# Patient Record
Sex: FEMALE | Race: WHITE | NOT HISPANIC OR LATINO | Employment: FULL TIME | RURAL
[De-identification: names, ages, dates, MRNs, and addresses within clinical notes are randomized per-mention and may not be internally consistent; named-entity substitution may affect disease eponyms.]

---

## 2022-12-19 ENCOUNTER — OFFICE VISIT (OUTPATIENT)
Dept: FAMILY MEDICINE CLINIC | Facility: CLINIC | Age: 32
End: 2022-12-19

## 2022-12-19 VITALS
RESPIRATION RATE: 14 BRPM | SYSTOLIC BLOOD PRESSURE: 118 MMHG | HEIGHT: 66 IN | WEIGHT: 252.38 LBS | TEMPERATURE: 97 F | OXYGEN SATURATION: 98 % | DIASTOLIC BLOOD PRESSURE: 80 MMHG | HEART RATE: 80 BPM | BODY MASS INDEX: 40.56 KG/M2

## 2022-12-19 DIAGNOSIS — E78.2 MIXED HYPERLIPIDEMIA: ICD-10-CM

## 2022-12-19 DIAGNOSIS — G43.009 MIGRAINE WITHOUT AURA AND WITHOUT STATUS MIGRAINOSUS, NOT INTRACTABLE: ICD-10-CM

## 2022-12-19 DIAGNOSIS — M54.50 CHRONIC BILATERAL LOW BACK PAIN WITHOUT SCIATICA: ICD-10-CM

## 2022-12-19 DIAGNOSIS — J30.1 SEASONAL ALLERGIC RHINITIS DUE TO POLLEN: ICD-10-CM

## 2022-12-19 DIAGNOSIS — E66.01 MORBID OBESITY: ICD-10-CM

## 2022-12-19 DIAGNOSIS — G89.29 CHRONIC BILATERAL LOW BACK PAIN WITHOUT SCIATICA: ICD-10-CM

## 2022-12-19 DIAGNOSIS — F41.1 GENERALIZED ANXIETY DISORDER: ICD-10-CM

## 2022-12-19 DIAGNOSIS — Z00.00 ROUTINE GENERAL MEDICAL EXAMINATION AT A HEALTH CARE FACILITY: Primary | ICD-10-CM

## 2022-12-19 DIAGNOSIS — K21.9 GASTROESOPHAGEAL REFLUX DISEASE WITHOUT ESOPHAGITIS: ICD-10-CM

## 2022-12-19 PROCEDURE — 3008F BODY MASS INDEX DOCD: CPT | Performed by: INTERNAL MEDICINE

## 2022-12-19 PROCEDURE — 99395 PREV VISIT EST AGE 18-39: CPT | Performed by: INTERNAL MEDICINE

## 2022-12-19 PROCEDURE — 36415 COLL VENOUS BLD VENIPUNCTURE: CPT | Performed by: INTERNAL MEDICINE

## 2022-12-19 PROCEDURE — 2014F MENTAL STATUS ASSESS: CPT | Performed by: INTERNAL MEDICINE

## 2022-12-19 RX ORDER — FLUTICASONE PROPIONATE 50 MCG
2 SPRAY, SUSPENSION (ML) NASAL DAILY
Qty: 15.8 ML | Refills: 3 | Status: SHIPPED | OUTPATIENT
Start: 2022-12-19

## 2022-12-19 RX ORDER — CETIRIZINE HYDROCHLORIDE 10 MG/1
10 TABLET ORAL DAILY
Qty: 30 TABLET | Refills: 3 | Status: SHIPPED | OUTPATIENT
Start: 2022-12-19

## 2022-12-19 RX ORDER — NORGESTIMATE AND ETHINYL ESTRADIOL 0.25-0.035
1 KIT ORAL DAILY
COMMUNITY
Start: 2022-09-20 | End: 2023-09-20

## 2022-12-19 RX ORDER — SUMATRIPTAN 50 MG/1
TABLET, FILM COATED ORAL
Qty: 9 TABLET | Refills: 1 | Status: SHIPPED | OUTPATIENT
Start: 2022-12-19

## 2022-12-19 NOTE — PROGRESS NOTES
Venipuncture Blood Specimen Collection  Venipuncture performed in left arm by Ernestina Grande MA with good hemostasis. Patient tolerated the procedure well without complications.   12/19/22   Ernestina Grande MA

## 2022-12-19 NOTE — ASSESSMENT & PLAN NOTE
Evaluated initially 1/4/2019, pattern of discomfort in the mid upper thoracic region, paraspinal muscular tenderness, with no neurologic manifestations concern.  Discussion in the past of consideration of physical therapy which she declined.  Doing overall better at this time.  Advised concerns.

## 2022-12-19 NOTE — PATIENT INSTRUCTIONS
Health Maintenance, Female  Adopting a healthy lifestyle and getting preventive care can go a long way to promote health and wellness. Talk with your health care provider about what schedule of regular examinations is right for you. This is a good chance for you to check in with your provider about disease prevention and staying healthy.  In between checkups, there are plenty of things you can do on your own. Experts have done a lot of research about which lifestyle changes and preventive measures are most likely to keep you healthy. Ask your health care provider for more information.  Weight and diet  Eat a healthy diet  Be sure to include plenty of vegetables, fruits, low-fat dairy products, and lean protein.  Do not eat a lot of foods high in solid fats, added sugars, or salt.  Get regular exercise. This is one of the most important things you can do for your health.  Most adults should exercise for at least 150 minutes each week. The exercise should increase your heart rate and make you sweat (moderate-intensity exercise).  Most adults should also do strengthening exercises at least twice a week. This is in addition to the moderate-intensity exercise.     Maintain a healthy weight  Body mass index (BMI) is a measurement that can be used to identify possible weight problems. It estimates body fat based on height and weight. Your health care provider can help determine your BMI and help you achieve or maintain a healthy weight.  For females 20 years of age and older:  A BMI below 18.5 is considered underweight.  A BMI of 18.5 to 24.9 is normal.  A BMI of 25 to 29.9 is considered overweight.  A BMI of 30 and above is considered obese.     Watch levels of cholesterol and blood lipids  You should start having your blood tested for lipids and cholesterol at 20 years of age, then have this test every 5 years.  You may need to have your cholesterol levels checked more often if:  Your lipid or cholesterol levels are  high.  You are older than 50 years of age.  You are at high risk for heart disease.     Cancer screening  Lung Cancer  Lung cancer screening is recommended for adults 55-80 years old who are at high risk for lung cancer because of a history of smoking.  A yearly low-dose CT scan of the lungs is recommended for people who:  Currently smoke.  Have quit within the past 15 years.  Have at least a 30-pack-year history of smoking. A pack year is smoking an average of one pack of cigarettes a day for 1 year.  Yearly screening should continue until it has been 15 years since you quit.  Yearly screening should stop if you develop a health problem that would prevent you from having lung cancer treatment.     Breast Cancer  Practice breast self-awareness. This means understanding how your breasts normally appear and feel.  It also means doing regular breast self-exams. Let your health care provider know about any changes, no matter how small.  If you are in your 20s or 30s, you should have a clinical breast exam (CBE) by a health care provider every 1-3 years as part of a regular health exam.  If you are 40 or older, have a CBE every year. Also consider having a breast X-ray (mammogram) every year.  If you have a family history of breast cancer, talk to your health care provider about genetic screening.  If you are at high risk for breast cancer, talk to your health care provider about having an MRI and a mammogram every year.  Breast cancer gene (BRCA) assessment is recommended for women who have family members with BRCA-related cancers. BRCA-related cancers include:  Breast.  Ovarian.  Tubal.  Peritoneal cancers.  Results of the assessment will determine the need for genetic counseling and BRCA1 and BRCA2 testing.     Cervical Cancer  Your health care provider may recommend that you be screened regularly for cancer of the pelvic organs (ovaries, uterus, and vagina). This screening involves a pelvic examination, including  checking for microscopic changes to the surface of your cervix (Pap test). You may be encouraged to have this screening done every 3 years, beginning at age 21.  For women ages 30-65, health care providers may recommend pelvic exams and Pap testing every 3 years, or they may recommend the Pap and pelvic exam, combined with testing for human papilloma virus (HPV), every 5 years. Some types of HPV increase your risk of cervical cancer. Testing for HPV may also be done on women of any age with unclear Pap test results.  Other health care providers may not recommend any screening for nonpregnant women who are considered low risk for pelvic cancer and who do not have symptoms. Ask your health care provider if a screening pelvic exam is right for you.  If you have had past treatment for cervical cancer or a condition that could lead to cancer, you need Pap tests and screening for cancer for at least 20 years after your treatment. If Pap tests have been discontinued, your risk factors (such as having a new sexual partner) need to be reassessed to determine if screening should resume. Some women have medical problems that increase the chance of getting cervical cancer. In these cases, your health care provider may recommend more frequent screening and Pap tests.     Colorectal Cancer  This type of cancer can be detected and often prevented.  Routine colorectal cancer screening usually begins at 50 years of age and continues through 75 years of age.  Your health care provider may recommend screening at an earlier age if you have risk factors for colon cancer.  Your health care provider may also recommend using home test kits to check for hidden blood in the stool.  A small camera at the end of a tube can be used to examine your colon directly (sigmoidoscopy or colonoscopy). This is done to check for the earliest forms of colorectal cancer.  Routine screening usually begins at age 50.  Direct examination of the colon should  be repeated every 5-10 years through 75 years of age. However, you may need to be screened more often if early forms of precancerous polyps or small growths are found.     Skin Cancer  Check your skin from head to toe regularly.  Tell your health care provider about any new moles or changes in moles, especially if there is a change in a mole's shape or color.  Also tell your health care provider if you have a mole that is larger than the size of a pencil eraser.  Always use sunscreen. Apply sunscreen liberally and repeatedly throughout the day.  Protect yourself by wearing long sleeves, pants, a wide-brimmed hat, and sunglasses whenever you are outside.     Heart disease, diabetes, and high blood pressure  High blood pressure causes heart disease and increases the risk of stroke. High blood pressure is more likely to develop in:  People who have blood pressure in the high end of the normal range (130-139/85-89 mm Hg).  People who are overweight or obese.  People who are .  If you are 18-39 years of age, have your blood pressure checked every 3-5 years. If you are 40 years of age or older, have your blood pressure checked every year. You should have your blood pressure measured twice--once when you are at a hospital or clinic, and once when you are not at a hospital or clinic. Record the average of the two measurements. To check your blood pressure when you are not at a hospital or clinic, you can use:  An automated blood pressure machine at a pharmacy.  A home blood pressure monitor.  If you are between 55 years and 79 years old, ask your health care provider if you should take aspirin to prevent strokes.  Have regular diabetes screenings. This involves taking a blood sample to check your fasting blood sugar level.  If you are at a normal weight and have a low risk for diabetes, have this test once every three years after 45 years of age.  If you are overweight and have a high risk for diabetes,  consider being tested at a younger age or more often.  Preventing infection  Hepatitis B  If you have a higher risk for hepatitis B, you should be screened for this virus. You are considered at high risk for hepatitis B if:  You were born in a country where hepatitis B is common. Ask your health care provider which countries are considered high risk.  Your parents were born in a high-risk country, and you have not been immunized against hepatitis B (hepatitis B vaccine).  You have HIV or AIDS.  You use needles to inject street drugs.  You live with someone who has hepatitis B.  You have had sex with someone who has hepatitis B.  You get hemodialysis treatment.  You take certain medicines for conditions, including cancer, organ transplantation, and autoimmune conditions.     Hepatitis C  Blood testing is recommended for:  Everyone born from 1945 through 1965.  Anyone with known risk factors for hepatitis C.     Sexually transmitted infections (STIs)  You should be screened for sexually transmitted infections (STIs) including gonorrhea and chlamydia if:  You are sexually active and are younger than 24 years of age.  You are older than 24 years of age and your health care provider tells you that you are at risk for this type of infection.  Your sexual activity has changed since you were last screened and you are at an increased risk for chlamydia or gonorrhea. Ask your health care provider if you are at risk.  If you do not have HIV, but are at risk, it may be recommended that you take a prescription medicine daily to prevent HIV infection. This is called pre-exposure prophylaxis (PrEP). You are considered at risk if:  You are sexually active and do not regularly use condoms or know the HIV status of your partner(s).  You take drugs by injection.  You are sexually active with a partner who has HIV.     Talk with your health care provider about whether you are at high risk of being infected with HIV. If you choose to  begin PrEP, you should first be tested for HIV. You should then be tested every 3 months for as long as you are taking PrEP.  Pregnancy  If you are premenopausal and you may become pregnant, ask your health care provider about preconception counseling.  If you may become pregnant, take 400 to 800 micrograms (mcg) of folic acid every day.  If you want to prevent pregnancy, talk to your health care provider about birth control (contraception).  Osteoporosis and menopause  Osteoporosis is a disease in which the bones lose minerals and strength with aging. This can result in serious bone fractures. Your risk for osteoporosis can be identified using a bone density scan.  If you are 65 years of age or older, or if you are at risk for osteoporosis and fractures, ask your health care provider if you should be screened.  Ask your health care provider whether you should take a calcium or vitamin D supplement to lower your risk for osteoporosis.  Menopause may have certain physical symptoms and risks.  Hormone replacement therapy may reduce some of these symptoms and risks.  Talk to your health care provider about whether hormone replacement therapy is right for you.  Follow these instructions at home:  Schedule regular health, dental, and eye exams.  Stay current with your immunizations.  Do not use any tobacco products including cigarettes, chewing tobacco, or electronic cigarettes.  If you are pregnant, do not drink alcohol.  If you are breastfeeding, limit how much and how often you drink alcohol.  Limit alcohol intake to no more than 1 drink per day for nonpregnant women. One drink equals 12 ounces of beer, 5 ounces of wine, or 1½ ounces of hard liquor.  Do not use street drugs.  Do not share needles.  Ask your health care provider for help if you need support or information about quitting drugs.  Tell your health care provider if you often feel depressed.  Tell your health care provider if you have ever been abused or do  not feel safe at home.  This information is not intended to replace advice given to you by your health care provider. Make sure you discuss any questions you have with your health care provider.  Document Released: 07/02/2012 Document Revised: 05/25/2017 Document Reviewed: 09/20/2016  [employer] Interactive Patient Education © 2018 [employer]

## 2022-12-19 NOTE — PROGRESS NOTES
Female Physical Note      Date: 2022   Patient Name: Keshia Alfredo  : 1990   MRN: [id number]     Chief Complaint   Patient presents with   • Annual Exam       History of Present Illness: Keshia Alfredo is a 32 y.o. female who is here today for their annual health maintenance and physical.  Also discussion with medical problems.  Previous noted lower back pain pattern has generally done better with periodic muscular type flares, responsive to anti-inflammatories, no associated weakness, no bowel or bladder incontinence, no shooting pain down legs.  Allergy symptoms do well, typical spring and fall triggers for which she is doing a little better now, requesting refills.  Anxiety symptoms continue good control on psychiatry locally and as needed therapy, on Zoloft since 2019 per psychiatry in [facility].  No SI/HI.  Regarding hyperlipidemia pattern and obesity from previous evaluations, she is making efforts to eat healthier and be more active, having decreased soda intake.  She has had 9 pound weight loss since early .  Otherwise previous reflux type symptoms have done well with infrequent but as needed use of over-the-counter antacid, she makes effort to eat healthier foods which does benefit as well.    Subjective      Review of Systems    Past Medical History, Social History, Family History and Care Team were all reviewed with patient and updated as appropriate.       Current Outpatient Medications:   •  norgestimate-ethinyl estradiol (ORTHO-CYCLEN) 0.25-35 MG-MCG per tablet, Take 1 tablet by mouth Daily., Disp: , Rfl:   •  sertraline (ZOLOFT) 50 MG tablet, Take 50 mg by mouth Every Morning., Disp: , Rfl:   •  cetirizine (zyrTEC) 10 MG tablet, Take 1 tablet by mouth Daily., Disp: 30 tablet, Rfl: 3  •  fluticasone (Flonase) 50 MCG/ACT nasal spray, 2 sprays into the nostril(s) as directed by provider Daily., Disp: 15.8 mL, Rfl: 3  •  SUMAtriptan (Imitrex) 50 MG tablet, Take one tablet at  onset of headache. May repeat dose one time in 2 hours if headache not relieved., Disp: 9 tablet, Rfl: 1    No Known Allergies    Health Maintenance Summary          Overdue - INFLUENZA VACCINE (Yearly - August to March) Overdue since 8/1/2022    10/12/2020  Imm Admin: Influenza, Unspecified    01/06/2019  Imm Admin: Influenza, Unspecified          Overdue - COVID-19 Vaccine (4 - Booster) Overdue since 9/20/2022 07/26/2022  Imm Admin: COVID-19 (MODERNA) 1st, 2nd, 3rd Dose Only    05/10/2021  Imm Admin: COVID-19 (MODERNA) 1st, 2nd, 3rd Dose Only    04/12/2021  Imm Admin: COVID-19 (UNSPECIFIED)    04/12/2021  Imm Admin: COVID-19 (MODERNA) 1st, 2nd, 3rd Dose Only          Ordered - HEPATITIS C SCREENING (Once) Ordered on 12/19/2022    No completion, postpone, or frequency change history exists for this topic.          Overdue - ANNUAL PHYSICAL (Yearly) Overdue - never done    No completion, postpone, or frequency change history exists for this topic.          Ordered - LIPID PANEL (Yearly) Ordered on 12/19/2022    No completion, postpone, or frequency change history exists for this topic.          PAP SMEAR (Every 3 Years) Next due on 1/1/2024 01/01/2021  Done - Negative PAP smear through Gyn early 2021, 3 year followup          TDAP/TD VACCINES (2 - Td or Tdap) Next due on 12/3/2024    12/03/2014  Imm Admin: Tdap          Pneumococcal Vaccine 0-64 (Series Information) Aged Out    No completion, postpone, or frequency change history exists for this topic.                Immunization History   Administered Date(s) Administered   • COVID-19 (MODERNA) 1st, 2nd, 3rd Dose Only 04/12/2021, 05/10/2021, 07/26/2022   • COVID-19 (UNSPECIFIED) 04/12/2021   • Hepatitis A 01/06/2019, 07/06/2019   • Influenza, Unspecified 01/06/2019, 10/12/2020   • Tdap 12/03/2014       Colorectal Screening:   Last Completed Colonoscopy     This patient has no relevant Health Maintenance data.        Pap:   Last Completed Pap Smear           PAP SMEAR (Every 3 Years) Next due on 1/1/2024 01/01/2021  Done - Negative PAP smear through Gyn early 2021, 3 year followup               Mammogram:   Last Completed Mammogram     This patient has no relevant Health Maintenance data.           CT for Smoker (Age 50-80, 20 pk yr): Not applicable  Hep C (Age 18-79 once): Pending  HIV (Age 15-65 once): Pending  A1c: No results found for: HGBA1C   Lipid panel: No results found for: LIPIDEXCLUSI    The ASCVD Risk score (Uziel DK, et al., 2019) failed to calculate for the following reasons:    The 2019 ASCVD risk score is only valid for ages 40 to 79      Tobacco Use: Unknown   • Smoking Tobacco Use: Never   • Smokeless Tobacco Use: Unknown   • Passive Exposure: Not on file       Social History     Substance and Sexual Activity   Alcohol Use Not Currently        Social History     Substance and Sexual Activity   Drug Use Not Currently        Diet/Physical activity: Ongoing attempts to improve dietary intake, with some healthier food types, specifically decreasing calorie containing beverages.  Increase activity level and still needs to schedule exercise    Social History     Substance and Sexual Activity   Sexual Activity Defer     No LMP recorded.    Depression: PHQ-2 Depression Screening  PHQ-9 Total Score: 0     Intimate partner violence: (Screen on initial visit, pregnant women, women with injuries, older adult with injury or evidence of neglect):  • Violence can be a problem in many people's lives, so I now ask every patient about trauma or abuse they may have experienced in a relationship.  • Stress/Safety - Do you feel safe in your relationship?  • Afraid/Abused - Have you ever been in a relationship where you were threatened, hurt, or afraid?  • Friend/Family - Are your friends aware you have been hurt?  • Emergency Plan - Do you have a safe place to go and the resources you need in an emergency?    Osteoporosis:   • Ost menopausal women < 65 with RF  "(advancing age, previous fracture, glucocorticoid therapy, parental hip fracture, low body weight, current cigarette smoking, excessive alcohol consumption, rheumatoid arthritis, secondary osteoporosis [hypogonadism/premature menopause, malabsorption, chronic liver disease, IBD]).  • All women 65 or older    Objective     Physical Exam:  Vitals:    12/19/22 0915 12/19/22 0931   BP: 128/95 118/80   BP Location: Right arm    Patient Position: Sitting    Cuff Size: Adult    Pulse: 80    Resp: 14    Temp: 97 °F (36.1 °C)    TempSrc: Temporal    SpO2: 98%    Weight: 114 kg (252 lb 6 oz)    Height: 167.6 cm (66\")      Body mass index is 40.73 kg/m².     Physical Exam  Constitutional:       General: She is not in acute distress.     Appearance: Normal appearance. She is obese. She is not ill-appearing, toxic-appearing or diaphoretic.   HENT:      Head: Normocephalic and atraumatic.      Right Ear: Ear canal and external ear normal.      Left Ear: Ear canal and external ear normal.      Ears:      Comments: Mild fluid behind the TMs bilaterally, otherwise clear     Nose: Rhinorrhea present.      Comments: Mild clear rhinorrhea     Mouth/Throat:      Mouth: Mucous membranes are moist.      Pharynx: Oropharynx is clear. No oropharyngeal exudate or posterior oropharyngeal erythema.   Eyes:      Extraocular Movements: Extraocular movements intact.      Conjunctiva/sclera: Conjunctivae normal.      Pupils: Pupils are equal, round, and reactive to light.   Cardiovascular:      Rate and Rhythm: Normal rate and regular rhythm.      Pulses: Normal pulses.      Heart sounds: Normal heart sounds. No murmur heard.    No friction rub. No gallop.   Pulmonary:      Effort: Pulmonary effort is normal. No respiratory distress.      Breath sounds: Normal breath sounds. No stridor. No wheezing.   Abdominal:      General: Abdomen is flat. Bowel sounds are normal. There is no distension.      Palpations: Abdomen is soft. There is no mass.      " Tenderness: There is no abdominal tenderness. There is no guarding or rebound.      Hernia: No hernia is present.   Genitourinary:     Comments: Per gynecology  Musculoskeletal:      Cervical back: Normal range of motion and neck supple.      Right lower leg: No edema.      Left lower leg: No edema.      Comments: No C/T/L spinous process tenderness.  No current paraspinal muscular tenderness.   Skin:     General: Skin is warm and dry.      Capillary Refill: Capillary refill takes less than 2 seconds.   Neurological:      General: No focal deficit present.      Mental Status: She is alert and oriented to person, place, and time. Mental status is at baseline.   Psychiatric:         Mood and Affect: Mood normal.         Behavior: Behavior normal.         Thought Content: Thought content normal.         Judgment: Judgment normal.         Procedures    Assessment / Plan      Assessment/Plan:   Diagnoses and all orders for this visit:    1. Routine general medical examination at a health care facility (Primary)  Assessment & Plan:  Last blood work 1/7/2019, order provided today with managed per results.  Tdap vaccine given 12/3/2014.  Multiple COVID vaccines but she is due for new by vaginal vaccine, recommend.  Recommend flu vaccine.  Pap smear obtained early 2021 by gynecologist in [place], she will be due in 3 years by her report    Orders:  -     CBC & Differential; Future  -     Comprehensive Metabolic Panel; Future  -     Urinalysis With Microscopic If Indicated (No Culture) - Urine, Clean Catch; Future  -     Lipid Panel; Future  -     TSH Rfx On Abnormal To Free T4; Future  -     Hepatitis C Antibody; Future  -     HIV-1 / O / 2 Ag / Antibody 4th Generation; Future  -     Hemoglobin A1c; Future  -     Hemoglobin A1c  -     Hepatitis C Antibody  -     TSH Rfx On Abnormal To Free T4  -     Lipid Panel  -     Urinalysis With Microscopic If Indicated (No Culture) - Urine, Clean Catch  -     Comprehensive Metabolic  Panel  -     CBC & Differential  -     HIV-1 / O / 2 Ag / Antibody 4th Generation    2. Seasonal allergic rhinitis due to pollen  Assessment & Plan:  Good response to as needed use of antihistamine and nasal steroid, refills provided.  Currently doing better after typical triggers in spring and fall.  Additional benefit of saline spray, nasal flushing, cool-mist humidifier.  Advised concerns.    Orders:  -     cetirizine (zyrTEC) 10 MG tablet; Take 1 tablet by mouth Daily.  Dispense: 30 tablet; Refill: 3  -     fluticasone (Flonase) 50 MCG/ACT nasal spray; 2 sprays into the nostril(s) as directed by provider Daily.  Dispense: 15.8 mL; Refill: 3    3. Migraine without aura and without status migrainosus, not intractable  Assessment & Plan:  New diagnosis 12/19/2022, with a pattern of what sounds with a few migraine headaches over the last 6 months or so, typical migrainous headache with light and sound sensitivity, period of nauseousness with an episode of vomiting 1 time, no altered sensorium, no other neurologic manifestations of concern.  Good response to over-the-counter antacids and dark environment.  I will add Imitrex 50 mg daily to her regimen, and due to relatively newer presence of this headache pattern, follow-up in 3 months time to ensure no transition or concern.      Orders:  -     SUMAtriptan (Imitrex) 50 MG tablet; Take one tablet at onset of headache. May repeat dose one time in 2 hours if headache not relieved.  Dispense: 9 tablet; Refill: 1    4. Chronic bilateral low back pain without sciatica  Assessment & Plan:  Evaluated initially 1/4/2019, pattern of discomfort in the mid upper thoracic region, paraspinal muscular tenderness, with no neurologic manifestations concern.  Discussion in the past of consideration of physical therapy which she declined.  Doing overall better at this time.  Advised concerns.      5. Generalized anxiety disorder  Assessment & Plan:  Long-standing, for which she has  ongoing counseling in [facility].  Initiation of Zoloft 50 mg daily at her 1/23/2019 appointment with psychiatry, for which she continues with psychiatry locally in [facility].  No SI/HI.  Continue lifestyle modifications to benefit mood including pursuit of enjoyable activities, good socialization, regular exercise, et cetera.  Advise worsening.      6. Gastroesophageal reflux disease without esophagitis  Assessment & Plan:  Intermittent pattern, responsive to infrequent use of over-the-counter antacids.  Of note she also had history of right upper quadrant abdominal pain leading to cholecystectomy 3/11/2016 by Dr. Pradhan, general surgeon and did well since.  Otherwise continue healthy diet, with reflux precautions discussed.      7. Morbid obesity (HCC)  Assessment & Plan:  BMI just above 40 range as consistent with morbid obesity, but she has been doing better with some healthier diet, decrease soda/calorie containing beverages and has had 9 pound weight loss since early 2022.  Reinforced importance of ongoing healthy diet, increase activity level and benefits of weight loss.      8. Mixed hyperlipidemia  Assessment & Plan:  1/7/2019 total cholesterol 231, triglycerides 113, HDL 52, .  Recommendation of healthcare dietary intake including healthier food choices, decrease portion sizes, and decrease snacking, and additional regular exercise.  She is making attempts to pursue a healthier lifestyle.  Recheck pending.        Healthcare Maintenance:  Counseling provided based on age appropriate USPSTF guidelines.  Class 3 Severe Obesity (BMI >=40). Obesity-related health conditions include the following: dyslipidemias and GERD. Obesity is improving with lifestyle modifications. BMI is is above average; BMI management plan is completed. We discussed low calorie, low carb based diet program, portion control, increasing exercise and joining a fitness center or start home based exercise  program.      Keshia Alfredo voices understanding and acceptance of this advice and will call back with any further questions or concerns. AVS with preventive healthcare tips printed for patient.     Follow Up:   Return in about 3 months (around 3/19/2023) for Next scheduled follow up.      Petr Galdamez MD  [facility]

## 2022-12-19 NOTE — ASSESSMENT & PLAN NOTE
Last blood work 1/7/2019, order provided today with managed per results.  Tdap vaccine given 12/3/2014.  Multiple COVID vaccines but she is due for new by vaginal vaccine, recommend.  Recommend flu vaccine.  Pap smear obtained early 2021 by gynecologist in [place], she will be due in 3 years by her report

## 2022-12-19 NOTE — ASSESSMENT & PLAN NOTE
BMI just above 40 range as consistent with morbid obesity, but she has been doing better with some healthier diet, decrease soda/calorie containing beverages and has had 9 pound weight loss since early 2022.  Reinforced importance of ongoing healthy diet, increase activity level and benefits of weight loss.

## 2022-12-19 NOTE — ASSESSMENT & PLAN NOTE
Intermittent pattern, responsive to infrequent use of over-the-counter antacids.  Of note she also had history of right upper quadrant abdominal pain leading to cholecystectomy 3/11/2016 by Dr. Pradhan, general surgeon and did well since.  Otherwise continue healthy diet, with reflux precautions discussed.

## 2022-12-19 NOTE — ASSESSMENT & PLAN NOTE
Good response to as needed use of antihistamine and nasal steroid, refills provided.  Currently doing better after typical triggers in spring and fall.  Additional benefit of saline spray, nasal flushing, cool-mist humidifier.  Advised concerns.

## 2022-12-19 NOTE — ASSESSMENT & PLAN NOTE
1/7/2019 total cholesterol 231, triglycerides 113, HDL 52, .  Recommendation of healthcare dietary intake including healthier food choices, decrease portion sizes, and decrease snacking, and additional regular exercise.  She is making attempts to pursue a healthier lifestyle.  Recheck pending.

## 2022-12-19 NOTE — ASSESSMENT & PLAN NOTE
New diagnosis 12/19/2022, with a pattern of what sounds with a few migraine headaches over the last 6 months or so, typical migrainous headache with light and sound sensitivity, period of nauseousness with an episode of vomiting 1 time, no altered sensorium, no other neurologic manifestations of concern.  Good response to over-the-counter antacids and dark environment.  I will add Imitrex 50 mg daily to her regimen, and due to relatively newer presence of this headache pattern, follow-up in 3 months time to ensure no transition or concern.

## 2022-12-19 NOTE — ASSESSMENT & PLAN NOTE
Long-standing, for which she has ongoing counseling in [facility].  Initiation of Zoloft 50 mg daily at her 1/23/2019 appointment with psychiatry, for which she continues with psychiatry locally in [facility].  No SI/HI.  Continue lifestyle modifications to benefit mood including pursuit of enjoyable activities, good socialization, regular exercise, et cetera.  Advise worsening.

## 2022-12-20 ENCOUNTER — TELEPHONE (OUTPATIENT)
Dept: FAMILY MEDICINE CLINIC | Facility: CLINIC | Age: 32
End: 2022-12-20

## 2022-12-20 LAB
ALBUMIN SERPL-MCNC: 4 G/DL (ref 3.8–4.8)
ALBUMIN/GLOB SERPL: 1.6 {RATIO} (ref 1.2–2.2)
ALP SERPL-CCNC: 81 IU/L (ref 44–121)
ALT SERPL-CCNC: 9 IU/L (ref 0–32)
APPEARANCE UR: ABNORMAL
AST SERPL-CCNC: 10 IU/L (ref 0–40)
BASOPHILS # BLD AUTO: 0.1 X10E3/UL (ref 0–0.2)
BASOPHILS NFR BLD AUTO: 1 %
BILIRUB SERPL-MCNC: 0.2 MG/DL (ref 0–1.2)
BILIRUB UR QL STRIP: NEGATIVE
BUN SERPL-MCNC: 9 MG/DL (ref 6–20)
BUN/CREAT SERPL: 12 (ref 9–23)
CALCIUM SERPL-MCNC: 9.2 MG/DL (ref 8.7–10.2)
CHLORIDE SERPL-SCNC: 104 MMOL/L (ref 96–106)
CHOLEST SERPL-MCNC: 255 MG/DL (ref 100–199)
CO2 SERPL-SCNC: 23 MMOL/L (ref 20–29)
COLOR UR: YELLOW
CREAT SERPL-MCNC: 0.76 MG/DL (ref 0.57–1)
EGFRCR SERPLBLD CKD-EPI 2021: 107 ML/MIN/1.73
EOSINOPHIL # BLD AUTO: 0.2 X10E3/UL (ref 0–0.4)
EOSINOPHIL NFR BLD AUTO: 2 %
ERYTHROCYTE [DISTWIDTH] IN BLOOD BY AUTOMATED COUNT: 13.8 % (ref 11.7–15.4)
GLOBULIN SER CALC-MCNC: 2.5 G/DL (ref 1.5–4.5)
GLUCOSE SERPL-MCNC: 94 MG/DL (ref 70–99)
GLUCOSE UR QL STRIP: NEGATIVE
HBA1C MFR BLD: 5.8 % (ref 4.8–5.6)
HCT VFR BLD AUTO: 40.8 % (ref 34–46.6)
HCV AB S/CO SERPL IA: <0.1 S/CO RATIO (ref 0–0.9)
HDLC SERPL-MCNC: 51 MG/DL
HGB BLD-MCNC: 13.4 G/DL (ref 11.1–15.9)
HGB UR QL STRIP: NEGATIVE
HIV 1+2 AB+HIV1 P24 AG SERPL QL IA: NON REACTIVE
IMM GRANULOCYTES # BLD AUTO: 0 X10E3/UL (ref 0–0.1)
IMM GRANULOCYTES NFR BLD AUTO: 0 %
KETONES UR QL STRIP: NEGATIVE
LDLC SERPL CALC-MCNC: 169 MG/DL (ref 0–99)
LEUKOCYTE ESTERASE UR QL STRIP: NEGATIVE
LYMPHOCYTES # BLD AUTO: 3.1 X10E3/UL (ref 0.7–3.1)
LYMPHOCYTES NFR BLD AUTO: 29 %
MCH RBC QN AUTO: 28.3 PG (ref 26.6–33)
MCHC RBC AUTO-ENTMCNC: 32.8 G/DL (ref 31.5–35.7)
MCV RBC AUTO: 86 FL (ref 79–97)
MICRO URNS: ABNORMAL
MONOCYTES # BLD AUTO: 0.4 X10E3/UL (ref 0.1–0.9)
MONOCYTES NFR BLD AUTO: 4 %
NEUTROPHILS # BLD AUTO: 6.9 X10E3/UL (ref 1.4–7)
NEUTROPHILS NFR BLD AUTO: 64 %
NITRITE UR QL STRIP: NEGATIVE
PH UR STRIP: 5.5 [PH] (ref 5–7.5)
PLATELET # BLD AUTO: 384 X10E3/UL (ref 150–450)
POTASSIUM SERPL-SCNC: 4 MMOL/L (ref 3.5–5.2)
PROT SERPL-MCNC: 6.5 G/DL (ref 6–8.5)
PROT UR QL STRIP: NEGATIVE
RBC # BLD AUTO: 4.74 X10E6/UL (ref 3.77–5.28)
SODIUM SERPL-SCNC: 141 MMOL/L (ref 134–144)
SP GR UR STRIP: 1.02 (ref 1–1.03)
T4 FREE SERPL-MCNC: 1.03 NG/DL (ref 0.82–1.77)
TRIGL SERPL-MCNC: 189 MG/DL (ref 0–149)
TSH SERPL DL<=0.005 MIU/L-ACNC: 7.02 UIU/ML (ref 0.45–4.5)
UROBILINOGEN UR STRIP-MCNC: 0.2 MG/DL (ref 0.2–1)
VLDLC SERPL CALC-MCNC: 35 MG/DL (ref 5–40)
WBC # BLD AUTO: 10.6 X10E3/UL (ref 3.4–10.8)

## 2022-12-20 NOTE — TELEPHONE ENCOUNTER
I spoke with the patient regarding blood work as obtained 12/19/2022.  Notable results as follows:    TSH slight elevation at 7.020 with upper limit of normal 4.5, with add on free T4 normal at 1.03.  HIV and hepatitis C virus screening both negative.  CBC with differential showing normal blood counts.  CMP with normal electrolytes, notable kidney function looks good with creatinine 0.76 and .  Normal proteins, normal liver function test.  Urinalysis with cloudy appearance but otherwise negative and nonconcerning.  Total cholesterol 255, triglycerides 189, HDL 51, .  Comparison 1/7/2019 with total cholesterol 231, triglycerides 113, HDL 52, .  Hemoglobin A1c as diabetic screen slight elevation at 5.8% into prediabetic range.    Based on blood work results, slight elevation of TSH with normal free T4 is consistent with a pattern called subclinical hypothyroidism which many times will resolve on its own or can be transient from illness or stressors, as such I would like to recheck a TSH and free T4 when she follows up in 3 months time.  Based on hemoglobin A1c in the prediabetic range, and cholesterol which is modestly worse compared to prior, reinforced importance of healthy diet, exercise, and ongoing benefit of at minimum modest weight loss.  I will recheck hemoglobin A1c when she follows up in 3 months time, no medication management is necessary at this time.

## 2022-12-22 ENCOUNTER — TELEPHONE (OUTPATIENT)
Dept: FAMILY MEDICINE CLINIC | Facility: CLINIC | Age: 32
End: 2022-12-22

## 2022-12-22 NOTE — TELEPHONE ENCOUNTER
I have went over her labs with her agagin and have let her know that he will redone labs in 3 months to recheck her levels. TF

## 2022-12-22 NOTE — TELEPHONE ENCOUNTER
Pt returned our call regarding lab results. Please call pt to explain results      Call pt and can leave detailed message  [phone]      Jeffery:  Phone: [phone] Fax: [phone]

## 2023-03-08 ENCOUNTER — OFFICE VISIT (OUTPATIENT)
Dept: FAMILY MEDICINE CLINIC | Facility: CLINIC | Age: 33
End: 2023-03-08
Payer: COMMERCIAL

## 2023-03-08 VITALS
DIASTOLIC BLOOD PRESSURE: 80 MMHG | HEART RATE: 89 BPM | WEIGHT: 252.6 LBS | OXYGEN SATURATION: 98 % | TEMPERATURE: 96.8 F | SYSTOLIC BLOOD PRESSURE: 116 MMHG | BODY MASS INDEX: 40.77 KG/M2

## 2023-03-08 DIAGNOSIS — J10.1 INFLUENZA A: Primary | ICD-10-CM

## 2023-03-08 PROBLEM — B34.9 VIRAL SYNDROME: Status: ACTIVE | Noted: 2023-03-08

## 2023-03-08 LAB
EXPIRATION DATE: ABNORMAL
FLUAV AG UPPER RESP QL IA.RAPID: DETECTED
FLUBV AG UPPER RESP QL IA.RAPID: NOT DETECTED
INTERNAL CONTROL: ABNORMAL
Lab: ABNORMAL
SARS-COV-2 AG UPPER RESP QL IA.RAPID: NOT DETECTED

## 2023-03-08 PROCEDURE — 87428 SARSCOV & INF VIR A&B AG IA: CPT | Performed by: INTERNAL MEDICINE

## 2023-03-08 PROCEDURE — 99213 OFFICE O/P EST LOW 20 MIN: CPT | Performed by: INTERNAL MEDICINE

## 2023-03-08 RX ORDER — SERTRALINE HYDROCHLORIDE 100 MG/1
100 TABLET, FILM COATED ORAL EVERY MORNING
COMMUNITY
Start: 2023-01-04

## 2023-03-08 RX ORDER — GUAIFENESIN/DEXTROMETHORPHAN 100-10MG/5
5 SYRUP ORAL 3 TIMES DAILY PRN
Qty: 120 ML | Refills: 0 | Status: SHIPPED | OUTPATIENT
Start: 2023-03-08 | End: 2023-03-20

## 2023-03-08 NOTE — PROGRESS NOTES
"    Office Note     Name: Keshia Alfredo    : 1990     MRN: [id number]     Chief Complaint  Exposure To Known Illness (Possible exposure to covid), Cough (Since yesterday), and Fever (Since yesterday, climbing to 100.5 last night with chills)    Subjective     History of Present Illness:  Keshia Alfredo is a 32 y.o. female who presents today for acute visit.  Onset yesterday of some achiness, subjective fever and chills with intermittent sore throat, congestion drainage and cough.  No difficulty breathing.  No chest tightness.  No nausea vomiting or diarrhea.  Similar symptoms but slightly progressed today.  No ear pain, intermittent headache.    Review of Systems    Objective     Past Medical History:   Diagnosis Date   • Hyperlipidemia 2019   • Seasonal allergic rhinitis    • Viral infection      Past Surgical History:   Procedure Laterality Date   • CHOLECYSTECTOMY Bilateral 2016     Family History   Problem Relation Age of Onset   • Hyperlipidemia Mother    • Graves' disease Mother    • Hypertension Father    • COPD Father    • Hypertension Maternal Grandmother    • Coronary artery disease Maternal Grandmother    • Hypertension Maternal Grandfather    • Coronary artery disease Maternal Grandfather        Vital Signs  /80 (BP Location: Left arm, Patient Position: Sitting, Cuff Size: Adult)   Pulse 89   Temp 96.8 °F (36 °C) (Temporal)   Wt 115 kg (252 lb 9.6 oz)   SpO2 98%   BMI 40.77 kg/m²   Estimated body mass index is 40.77 kg/m² as calculated from the following:    Height as of 22: 167.6 cm (66\").    Weight as of this encounter: 115 kg (252 lb 9.6 oz).    Physical Exam  Constitutional:       General: She is not in acute distress.     Appearance: She is obese. She is not ill-appearing, toxic-appearing or diaphoretic.      Comments: Pleasant, tired, nontoxic.   HENT:      Head: Normocephalic and atraumatic.      Right Ear: Ear canal and external ear normal.      Left Ear: Ear " canal and external ear normal.      Ears:      Comments: Mild fluid behind the TMs bilaterally, otherwise clear     Nose: Rhinorrhea present.      Comments: Moderate clear rhinorrhea     Mouth/Throat:      Mouth: Mucous membranes are moist.      Pharynx: Oropharynx is clear. No oropharyngeal exudate or posterior oropharyngeal erythema.   Eyes:      Extraocular Movements: Extraocular movements intact.      Conjunctiva/sclera: Conjunctivae normal.      Pupils: Pupils are equal, round, and reactive to light.   Cardiovascular:      Rate and Rhythm: Normal rate and regular rhythm.      Pulses: Normal pulses.      Heart sounds: Normal heart sounds. No murmur heard.    No friction rub. No gallop.   Pulmonary:      Effort: Pulmonary effort is normal. No respiratory distress.      Breath sounds: Normal breath sounds. No stridor. No wheezing.   Abdominal:      General: Abdomen is flat. Bowel sounds are normal. There is no distension.      Palpations: Abdomen is soft. There is no mass.      Tenderness: There is no abdominal tenderness. There is no guarding or rebound.      Hernia: No hernia is present.   Skin:     General: Skin is warm and dry.      Capillary Refill: Capillary refill takes less than 2 seconds.   Neurological:      General: No focal deficit present.      Mental Status: She is alert and oriented to person, place, and time. Mental status is at baseline.   Psychiatric:         Mood and Affect: Mood normal.         Behavior: Behavior normal.         Thought Content: Thought content normal.                   POCT Results (if applicable):  Results for orders placed or performed in visit on 03/08/23   POCT SARS-CoV-2 Antigen CHAD    Specimen: Swab   Result Value Ref Range    SARS Antigen Not Detected Not Detected, Presumptive Negative    Influenza A Antigen CHAD Detected (A) Not Detected    Influenza B Antigen CHAD Not Detected Not Detected    Internal Control Passed Passed    Lot Number [id number]     Expiration Date  3/16/2024             Assessment and Plan     Diagnoses and all orders for this visit:    1. Influenza A (Primary)  Assessment & Plan:  Flu screen positive for influenza A, negative influenza B.  COVID-19 testing negative.  Common in the community although had been less so.  No lower respiratory signs or symptoms concern.  Good hydration.  Patient declines Tamiflu as she is overall doing quite well with her symptoms.  Prescription provided for Robitussin-DM to use as necessary.  Additional benefit of saline spray, cool-mist humidifier, Tylenol/Advil as needed.  [place] note provided for work the rest of the week.  Advised if not improving.    Orders:  -     guaiFENesin-dextromethorphan (ROBITUSSIN DM) 100-10 MG/5ML syrup; Take 5 mL by mouth 3 (Three) Times a Day As Needed for Cough.  Dispense: 120 mL; Refill: 0  -     POCT SARS-CoV-2 Antigen CHAD    Follow Up  No follow-ups on file.    Petr Galdamez MD

## 2023-03-08 NOTE — ASSESSMENT & PLAN NOTE
Flu screen positive for influenza A, negative influenza B.  COVID-19 testing negative.  Common in the community although had been less so.  No lower respiratory signs or symptoms concern.  Good hydration.  Patient declines Tamiflu as she is overall doing quite well with her symptoms.  Prescription provided for Robitussin-DM to use as necessary.  Additional benefit of saline spray, cool-mist humidifier, Tylenol/Advil as needed.  [place] note provided for work the rest of the week.  Advised if not improving.  
normal

## 2023-03-20 ENCOUNTER — OFFICE VISIT (OUTPATIENT)
Dept: FAMILY MEDICINE CLINIC | Facility: CLINIC | Age: 33
End: 2023-03-20
Payer: COMMERCIAL

## 2023-03-20 VITALS
SYSTOLIC BLOOD PRESSURE: 124 MMHG | HEIGHT: 66 IN | TEMPERATURE: 98.1 F | DIASTOLIC BLOOD PRESSURE: 72 MMHG | HEART RATE: 70 BPM | OXYGEN SATURATION: 99 % | WEIGHT: 250.13 LBS | BODY MASS INDEX: 40.2 KG/M2

## 2023-03-20 DIAGNOSIS — R94.6 ABNORMAL THYROID FUNCTION TEST: ICD-10-CM

## 2023-03-20 DIAGNOSIS — E78.2 MIXED HYPERLIPIDEMIA: ICD-10-CM

## 2023-03-20 DIAGNOSIS — E66.01 MORBID OBESITY: ICD-10-CM

## 2023-03-20 DIAGNOSIS — G43.009 MIGRAINE WITHOUT AURA AND WITHOUT STATUS MIGRAINOSUS, NOT INTRACTABLE: ICD-10-CM

## 2023-03-20 DIAGNOSIS — R73.03 PREDIABETES: Primary | ICD-10-CM

## 2023-03-20 DIAGNOSIS — F41.1 GENERALIZED ANXIETY DISORDER: ICD-10-CM

## 2023-03-20 DIAGNOSIS — J30.1 SEASONAL ALLERGIC RHINITIS DUE TO POLLEN: ICD-10-CM

## 2023-03-20 LAB
EXPIRATION DATE: NORMAL
GLUCOSE BLDC GLUCOMTR-MCNC: 108 MG/DL (ref 70–130)
HBA1C MFR BLD: 6 %
Lab: NORMAL

## 2023-03-20 PROCEDURE — 99214 OFFICE O/P EST MOD 30 MIN: CPT | Performed by: INTERNAL MEDICINE

## 2023-03-20 PROCEDURE — 82962 GLUCOSE BLOOD TEST: CPT | Performed by: INTERNAL MEDICINE

## 2023-03-20 PROCEDURE — 1160F RVW MEDS BY RX/DR IN RCRD: CPT | Performed by: INTERNAL MEDICINE

## 2023-03-20 PROCEDURE — 3044F HG A1C LEVEL LT 7.0%: CPT | Performed by: INTERNAL MEDICINE

## 2023-03-20 PROCEDURE — 1159F MED LIST DOCD IN RCRD: CPT | Performed by: INTERNAL MEDICINE

## 2023-03-20 PROCEDURE — 83036 HEMOGLOBIN GLYCOSYLATED A1C: CPT | Performed by: INTERNAL MEDICINE

## 2023-03-20 NOTE — ASSESSMENT & PLAN NOTE
12/19/2022 total cholesterol 255, triglycerides 189, HDL 51, .  Comparison 1/7/2019 with total cholesterol 231, triglycerides 113, HDL 52, .  Modestly worsening, but no indication for statin therapy at this time.  Reinforced importance of healthy diet, exercise and benefits of weight loss.  Monitor yearly.

## 2023-03-20 NOTE — ASSESSMENT & PLAN NOTE
New diagnosis 12/19/2022, with a pattern of what sounds with a few migraine headaches over the last 6 months or so, typical migrainous headache with light and sound sensitivity, period of nauseousness with an episode of vomiting 1 time, no altered sensorium, no other neurologic manifestations of concern.  Good response to over-the-counter antacids and dark environment.  Addition of Imitrex 50 mg daily as of 12/19/2022 with benefit of infrequent breakthrough headache.  Overall doing modestly better, continue regimen unchanged.  We could consider adding preventative medicine if this became more frequent in future.

## 2023-03-20 NOTE — ASSESSMENT & PLAN NOTE
With screening blood work 12/19/2022 she had TSH of 7.020, mildly elevated and normal free T41.03.  Discussed this could be transient from viral infection, or more of a subclinical hypothyroidism that can sometimes self resolved.  As such we will recheck TSH and free T4 today with managed per results.  Of note the patient does not have any hyperthyroid or hypothyroid type symptoms.

## 2023-03-20 NOTE — ASSESSMENT & PLAN NOTE
Modest flare of allergies over the last week or so, recommend resumption of Zyrtec, Flonase for the next few weeks, then as needed.  Additional benefit of saline spray, nasal flushing.  Advise concerns.

## 2023-03-20 NOTE — ASSESSMENT & PLAN NOTE
Long-standing, for which she has ongoing counseling in [facility].  Initiation of Zoloft 50 mg daily at her 1/23/2019 appointment with psychiatry, for which she continues with psychiatry locally in [facility].   Increase to Zoloft 100 mg daily as of early 2023 with improved symptom control. No SI/HI.  Continue lifestyle modifications to benefit mood including pursuit of enjoyable activities, good socialization, regular exercise, et cetera.  Advise worsening.

## 2023-03-20 NOTE — PROGRESS NOTES
Follow Up Office Visit      Date: 2023   Patient Name: Keshia Alfredo  : 1990   MRN: [id number]     Chief Complaint:    Chief Complaint   Patient presents with   • Follow-up       History of Present Illness: Keshia Alfredo is a 32 y.o. female who is here today to follow up with multiple medical problems after last complete physical on 2022.  With migraine headache pattern, addition of Imitrex has been beneficial in aborting her infrequent headache pattern which is unchanged in that regard.  Allergies are starting to flareup a little bit of caution that is a potential trigger to her migraines, but she has not yet started using antihistamine and nasal steroid and I recommended potential benefit.  Regarding her anxiety symptoms as managed by psychiatry, in the last couple months they have increased her Zoloft from 50 mg to 100 mg daily and she feels that has been beneficial.  Of also discussed the potential connection to stress and her migraine headache pattern.  Since her last visit, her thyroid function showed a pattern of subclinical hypothyroidism and we are rechecking her TSH and free T4 today in clinic, but she is having no hyperthyroid or hypothyroid type symptoms.  New prediabetic diagnosis with blood work 2022 with hemoglobin A1c of 5.8%.  She is making some efforts to eat healthier and be more active but it is difficult to maintain but she understands long-term benefit.  No polyuria or polydipsia.    Subjective      Review of Systems:   Review of Systems    I have reviewed the patients family history, social history, past medical history, past surgical history and have updated it as appropriate.     Medications:     Current Outpatient Medications:   •  cetirizine (zyrTEC) 10 MG tablet, Take 1 tablet by mouth Daily., Disp: 30 tablet, Rfl: 3  •  fluticasone (Flonase) 50 MCG/ACT nasal spray, 2 sprays into the nostril(s) as directed by provider Daily., Disp: 15.8 mL, Rfl: 3  •   "norgestimate-ethinyl estradiol (ORTHO-CYCLEN) 0.25-35 MG-MCG per tablet, Take 1 tablet by mouth Daily., Disp: , Rfl:   •  sertraline (ZOLOFT) 100 MG tablet, Take 1 tablet by mouth Every Morning., Disp: , Rfl:   •  SUMAtriptan (Imitrex) 50 MG tablet, Take one tablet at onset of headache. May repeat dose one time in 2 hours if headache not relieved., Disp: 9 tablet, Rfl: 1    Allergies:   No Known Allergies    Objective     Physical Exam: Please see above  Vital Signs:   Vitals:    03/20/23 0908   BP: 124/72   BP Location: Right arm   Patient Position: Sitting   Cuff Size: Adult   Pulse: 70   Temp: 98.1 °F (36.7 °C)   TempSrc: Temporal   SpO2: 99%   Weight: 113 kg (250 lb 2 oz)   Height: 167.6 cm (66\")     Body mass index is 40.37 kg/m².    Physical Exam  Constitutional:       General: She is not in acute distress.     Appearance: Normal appearance. She is obese. She is not ill-appearing, toxic-appearing or diaphoretic.   HENT:      Head: Normocephalic and atraumatic.      Right Ear: Ear canal and external ear normal.      Left Ear: Ear canal and external ear normal.      Ears:      Comments: Mild fluid behind the TMs bilaterally, otherwise clear     Nose: Rhinorrhea present.      Comments: Mild to moderate clear rhinorrhea, pale mucosa     Mouth/Throat:      Mouth: Mucous membranes are moist.      Pharynx: Oropharynx is clear. No oropharyngeal exudate or posterior oropharyngeal erythema.   Eyes:      Extraocular Movements: Extraocular movements intact.      Conjunctiva/sclera: Conjunctivae normal.      Pupils: Pupils are equal, round, and reactive to light.   Cardiovascular:      Rate and Rhythm: Normal rate and regular rhythm.      Pulses: Normal pulses.      Heart sounds: Normal heart sounds. No murmur heard.    No friction rub. No gallop.   Pulmonary:      Effort: Pulmonary effort is normal. No respiratory distress.      Breath sounds: Normal breath sounds. No stridor. No wheezing.   Abdominal:      General: " Abdomen is flat. Bowel sounds are normal. There is no distension.      Palpations: Abdomen is soft.      Tenderness: There is no abdominal tenderness. There is no guarding or rebound.   Skin:     General: Skin is warm and dry.      Capillary Refill: Capillary refill takes less than 2 seconds.   Neurological:      General: No focal deficit present.      Mental Status: She is alert and oriented to person, place, and time. Mental status is at baseline.   Psychiatric:         Mood and Affect: Mood normal.         Behavior: Behavior normal.         Thought Content: Thought content normal.         Procedures    Results:   Labs:   Hemoglobin A1C   Date Value Ref Range Status   03/20/2023 6.0 % Final     TSH   Date Value Ref Range Status   12/19/2022 7.020 (H) 0.450 - 4.500 uIU/mL Final        Imaging:   No valid procedures specified.     Class 3 Severe Obesity (BMI >=40). Obesity-related health conditions include the following: GERD. Obesity is unchanged. BMI is is above average; BMI management plan is completed. We discussed low calorie, low carb based diet program, portion control, increasing exercise and joining a fitness center or start home based exercise program.        Assessment / Plan      Assessment/Plan:   Diagnoses and all orders for this visit:    1. Prediabetes (Primary)  Assessment & Plan:  New diagnosis with hemoglobin A1c of 5.8% on 12/19/2022.  Modestly increased to 6.0% with nonfasting glucose 108 today on 3/20/2023.  I reinforced importance of healthy diet, exercise, benefits of even modest weight loss.  With the slight progression I would like to follow her up in 3 months time to reassess, sooner as needed.  She is aware of polyuria and polydipsia as potential signs of progression to diabetic pattern.    Orders:  -     POC Glycosylated Hemoglobin (Hb A1C)  -     POC Glucose    2. Abnormal thyroid function test  Assessment & Plan:  With screening blood work 12/19/2022 she had TSH of 7.020, mildly  elevated and normal free T41.03.  Discussed this could be transient from viral infection, or more of a subclinical hypothyroidism that can sometimes self resolved.  As such we will recheck TSH and free T4 today with managed per results.  Of note the patient does not have any hyperthyroid or hypothyroid type symptoms.    Orders:  -     T4, Free; Future  -     TSH; Future  -     TSH  -     T4, Free    3. Generalized anxiety disorder  Assessment & Plan:  Long-standing, for which she has ongoing counseling in [facility].  Initiation of Zoloft 50 mg daily at her 1/23/2019 appointment with psychiatry, for which she continues with psychiatry locally in [facility].   Increase to Zoloft 100 mg daily as of early 2023 with improved symptom control. No SI/HI.  Continue lifestyle modifications to benefit mood including pursuit of enjoyable activities, good socialization, regular exercise, et cetera.  Advise worsening.      4. Migraine without aura and without status migrainosus, not intractable  Assessment & Plan:  New diagnosis 12/19/2022, with a pattern of what sounds with a few migraine headaches over the last 6 months or so, typical migrainous headache with light and sound sensitivity, period of nauseousness with an episode of vomiting 1 time, no altered sensorium, no other neurologic manifestations of concern.  Good response to over-the-counter antacids and dark environment.  Addition of Imitrex 50 mg daily as of 12/19/2022 with benefit of infrequent breakthrough headache.  Overall doing modestly better, continue regimen unchanged.  We could consider adding preventative medicine if this became more frequent in future.        5. Mixed hyperlipidemia  Assessment & Plan:  12/19/2022 total cholesterol 255, triglycerides 189, HDL 51, .  Comparison 1/7/2019 with total cholesterol 231, triglycerides 113, HDL 52, .  Modestly worsening, but no indication for statin therapy at this time.  Reinforced importance  of healthy diet, exercise and benefits of weight loss.  Monitor yearly.      6. Morbid obesity (HCC)  Assessment & Plan:  BMI just above 40 range as consistent with morbid obesity, but she has been doing better with some healthier diet, decrease soda/calorie containing beverages and has had 9 pound weight loss since early 2022.  Reinforced importance of ongoing healthy diet, increase activity level and benefits of weight loss.       7. Seasonal allergic rhinitis due to pollen  Assessment & Plan:  Modest flare of allergies over the last week or so, recommend resumption of Zyrtec, Flonase for the next few weeks, then as needed.  Additional benefit of saline spray, nasal flushing.  Advise concerns.        Follow Up:   Return in about 3 months (around 6/20/2023) for Next scheduled follow up.        Petr Galdamez MD  [facility]   Answers for HPI/ROS submitted by the patient on 3/20/2023  What is the primary reason for your visit?: Physical

## 2023-03-20 NOTE — PROGRESS NOTES
Venipuncture Blood Specimen Collection  Venipuncture performed in left arm by Barbara Saunders MA with good hemostasis. Patient tolerated the procedure well without complications.   03/20/23   Barbara Saunders MA

## 2023-03-20 NOTE — ASSESSMENT & PLAN NOTE
New diagnosis with hemoglobin A1c of 5.8% on 12/19/2022.  Modestly increased to 6.0% with nonfasting glucose 108 today on 3/20/2023.  I reinforced importance of healthy diet, exercise, benefits of even modest weight loss.  With the slight progression I would like to follow her up in 3 months time to reassess, sooner as needed.  She is aware of polyuria and polydipsia as potential signs of progression to diabetic pattern.

## 2023-03-21 LAB
T4 FREE SERPL-MCNC: 1.17 NG/DL (ref 0.82–1.77)
TSH SERPL DL<=0.005 MIU/L-ACNC: 3.53 UIU/ML (ref 0.45–4.5)

## 2023-10-21 DIAGNOSIS — J30.1 SEASONAL ALLERGIC RHINITIS DUE TO POLLEN: ICD-10-CM

## 2023-10-23 RX ORDER — CETIRIZINE HYDROCHLORIDE 10 MG/1
10 TABLET ORAL DAILY
Qty: 30 TABLET | Refills: 0 | Status: SHIPPED | OUTPATIENT
Start: 2023-10-23

## 2023-11-24 DIAGNOSIS — J30.1 SEASONAL ALLERGIC RHINITIS DUE TO POLLEN: ICD-10-CM

## 2023-11-27 RX ORDER — CETIRIZINE HYDROCHLORIDE 10 MG/1
10 TABLET ORAL DAILY
Qty: 30 TABLET | Refills: 0 | Status: SHIPPED | OUTPATIENT
Start: 2023-11-27

## 2023-12-22 DIAGNOSIS — J30.1 SEASONAL ALLERGIC RHINITIS DUE TO POLLEN: ICD-10-CM

## 2023-12-22 RX ORDER — CETIRIZINE HYDROCHLORIDE 10 MG/1
10 TABLET, FILM COATED ORAL DAILY
Qty: 30 TABLET | Refills: 0 | Status: SHIPPED | OUTPATIENT
Start: 2023-12-22

## 2024-01-08 ENCOUNTER — OFFICE VISIT (OUTPATIENT)
Dept: FAMILY MEDICINE CLINIC | Facility: CLINIC | Age: 34
End: 2024-01-08
Payer: COMMERCIAL

## 2024-01-08 VITALS
SYSTOLIC BLOOD PRESSURE: 126 MMHG | OXYGEN SATURATION: 99 % | BODY MASS INDEX: 40.34 KG/M2 | TEMPERATURE: 98.4 F | DIASTOLIC BLOOD PRESSURE: 84 MMHG | HEIGHT: 66 IN | WEIGHT: 251 LBS | HEART RATE: 76 BPM

## 2024-01-08 DIAGNOSIS — Z00.00 ROUTINE GENERAL MEDICAL EXAMINATION AT A HEALTH CARE FACILITY: Primary | ICD-10-CM

## 2024-01-08 DIAGNOSIS — E78.2 MIXED HYPERLIPIDEMIA: ICD-10-CM

## 2024-01-08 DIAGNOSIS — Z23 NEED FOR VACCINATION: ICD-10-CM

## 2024-01-08 DIAGNOSIS — K21.9 GASTROESOPHAGEAL REFLUX DISEASE WITHOUT ESOPHAGITIS: ICD-10-CM

## 2024-01-08 DIAGNOSIS — E66.01 MORBID OBESITY: ICD-10-CM

## 2024-01-08 DIAGNOSIS — F41.1 GENERALIZED ANXIETY DISORDER: ICD-10-CM

## 2024-01-08 DIAGNOSIS — R94.6 ABNORMAL THYROID FUNCTION TEST: ICD-10-CM

## 2024-01-08 DIAGNOSIS — R73.03 PREDIABETES: ICD-10-CM

## 2024-01-08 DIAGNOSIS — G43.009 MIGRAINE WITHOUT AURA AND WITHOUT STATUS MIGRAINOSUS, NOT INTRACTABLE: ICD-10-CM

## 2024-01-08 DIAGNOSIS — J30.1 SEASONAL ALLERGIC RHINITIS DUE TO POLLEN: ICD-10-CM

## 2024-01-08 DIAGNOSIS — L72.9 SUBCUTANEOUS CYST: ICD-10-CM

## 2024-01-08 LAB
EXPIRATION DATE: NORMAL
GLUCOSE BLDC GLUCOMTR-MCNC: 105 MG/DL (ref 70–130)
HBA1C MFR BLD: 5.7 % (ref 4.5–5.7)
Lab: NORMAL

## 2024-01-08 PROCEDURE — 90686 IIV4 VACC NO PRSV 0.5 ML IM: CPT | Performed by: INTERNAL MEDICINE

## 2024-01-08 PROCEDURE — 82948 REAGENT STRIP/BLOOD GLUCOSE: CPT | Performed by: INTERNAL MEDICINE

## 2024-01-08 PROCEDURE — 1160F RVW MEDS BY RX/DR IN RCRD: CPT | Performed by: INTERNAL MEDICINE

## 2024-01-08 PROCEDURE — 3044F HG A1C LEVEL LT 7.0%: CPT | Performed by: INTERNAL MEDICINE

## 2024-01-08 PROCEDURE — 2014F MENTAL STATUS ASSESS: CPT | Performed by: INTERNAL MEDICINE

## 2024-01-08 PROCEDURE — 83036 HEMOGLOBIN GLYCOSYLATED A1C: CPT | Performed by: INTERNAL MEDICINE

## 2024-01-08 PROCEDURE — 90471 IMMUNIZATION ADMIN: CPT | Performed by: INTERNAL MEDICINE

## 2024-01-08 PROCEDURE — 99395 PREV VISIT EST AGE 18-39: CPT | Performed by: INTERNAL MEDICINE

## 2024-01-08 PROCEDURE — 1159F MED LIST DOCD IN RCRD: CPT | Performed by: INTERNAL MEDICINE

## 2024-01-08 RX ORDER — FLUTICASONE PROPIONATE 50 MCG
2 SPRAY, SUSPENSION (ML) NASAL DAILY
Qty: 15.8 ML | Refills: 3 | Status: SHIPPED | OUTPATIENT
Start: 2024-01-08

## 2024-01-08 RX ORDER — SERTRALINE HYDROCHLORIDE 100 MG/1
100 TABLET, FILM COATED ORAL DAILY
Qty: 90 TABLET | Refills: 1 | Status: SHIPPED | OUTPATIENT
Start: 2024-01-08

## 2024-01-08 RX ORDER — SUMATRIPTAN 50 MG/1
TABLET, FILM COATED ORAL
Qty: 9 TABLET | Refills: 1 | Status: SHIPPED | OUTPATIENT
Start: 2024-01-08

## 2024-01-08 RX ORDER — CETIRIZINE HYDROCHLORIDE 10 MG/1
10 TABLET ORAL DAILY
Qty: 30 TABLET | Refills: 3 | Status: SHIPPED | OUTPATIENT
Start: 2024-01-08

## 2024-01-08 RX ORDER — NORGESTIMATE AND ETHINYL ESTRADIOL 0.25-0.035
1 KIT ORAL DAILY
COMMUNITY
Start: 2023-09-26 | End: 2024-09-25

## 2024-01-08 NOTE — ASSESSMENT & PLAN NOTE
BMI in the low 40s range with stability over the last couple years, but trouble losing weight despite what patient feels to be modest improvement in diet and activity.  Continue changes in that regard.  I did discuss potential benefits of treatment such as phentermine or GLP-1 class of medication but she would like to hold off on medicines, try with lifestyle modifications first.  Advise concerns.

## 2024-01-08 NOTE — ASSESSMENT & PLAN NOTE
Diagnosis with hemoglobin A1c of 5.8% on 12/19/2022.  Modestly worsened to 5.7% today, previous 5.6% on 6/26/2023 after modestly increased to 6.0% on 3/20/2023. I reinforced importance of healthy diet, exercise, benefits of even modest weight loss.  With stabilized pattern, follow-up in 6 months to reassess or sooner as needed.  She is aware of polyuria and polydipsia as potential signs of progression to diabetic pattern.

## 2024-01-08 NOTE — PROGRESS NOTES
Female Physical Note      Date: 2024   Patient Name: Keshia Alfredo  : 1990   MRN: [id number]     Chief Complaint   Patient presents with    Annual Exam       History of Present Illness: Keshia Alfredo is a 33 y.o. female who is here today for their annual health maintenance and physical.  Also discussion multimedical problems.  Regarding prediabetic pattern she is making efforts to eat healthier and be more active although has difficulty maintaining long-term and has had a little/during the winter/holiday season.  Anxiety symptoms continued with stability on Zoloft 100 mg daily, she feels that helps manage her stressors and overall she is pleased with current regimen, declining need for further treatment.  She continues to make efforts to be active, pursue social situations.  No SI/HI.  Allergy symptoms generally spring and fall, currently settling down.  Regarding migraine headache pattern, still once every month or 2 for which she has used Imitrex a couple times in the past with benefit.  Overall stability.  Regarding obesity and hyperlipidemia, efforts to eat healthy and be active but difficulty maintaining as noted above.  Regarding head cyst, noted on the top of the mid of the head for many years, nonspecific larger but she is noticed sometimes it is tender, not clearly with brushing her hair or wearing a hat, etc., and it is also not to the point that she would want to have removed.    Subjective      Review of Systems    Past Medical History, Social History, Family History and Care Team were all reviewed with patient and updated as appropriate.       Current Outpatient Medications:     cetirizine (EQ Allergy Relief, Cetirizine,) 10 MG tablet, Take 1 tablet by mouth Daily., Disp: 30 tablet, Rfl: 3    fluticasone (Flonase) 50 MCG/ACT nasal spray, 2 sprays into the nostril(s) as directed by provider Daily., Disp: 15.8 mL, Rfl: 3    norgestimate-ethinyl estradiol (ORTHO-CYCLEN) 0.25-35 MG-MCG  per tablet, Take 1 tablet by mouth Daily., Disp: , Rfl:     sertraline (ZOLOFT) 100 MG tablet, Take 1 tablet by mouth Daily., Disp: 90 tablet, Rfl: 1    SUMAtriptan (Imitrex) 50 MG tablet, Take one tablet at onset of headache. May repeat dose one time in 2 hours if headache not relieved., Disp: 9 tablet, Rfl: 1    No Known Allergies    Health Maintenance Summary            Overdue - COVID-19 Vaccine (5 - 2023-24 season) Overdue since 9/1/2023 07/26/2022  Imm Admin: COVID-19 (MODERNA) 1st,2nd,3rd Dose Monovalent    05/10/2021  Imm Admin: COVID-19 (MODERNA) 1st,2nd,3rd Dose Monovalent    04/12/2021  Imm Admin: COVID-19 (UNSPECIFIED)    04/12/2021  Imm Admin: COVID-19 (MODERNA) 1st,2nd,3rd Dose Monovalent              Ordered - LIPID PANEL (Yearly) Ordered on 1/8/2024 12/19/2022  Lipid Panel              BMI FOLLOWUP (Yearly) Next due on 3/20/2024      03/20/2023  [employer]: WORKFLOW - QUALITY MEASUREMENT - BMI FOLLOW UP CARE PLAN DOCUMENTED    12/19/2022  Smart[employer]: WORKFLOW - QUALITY MEASUREMENT - BMI FOLLOW UP CARE PLAN DOCUMENTED              TDAP/TD VACCINES (2 - Td or Tdap) Next due on 12/3/2024      12/03/2014  Imm Admin: Tdap              ANNUAL PHYSICAL (Yearly) Next due on 1/8/2025 01/08/2024  Done    12/19/2022  Registry Metric: Last Annual Physical              PAP SMEAR (Every 3 Years) Next due on 9/8/2026 09/08/2023  Patient-Reported (Performed Externally)    01/01/2021  Done - Negative PAP smear through Gyn early 2021, 3 year followup              HEPATITIS C SCREENING  Completed      12/19/2022  Hep C Virus Ab component of Hepatitis C Antibody              INFLUENZA VACCINE  Completed      01/08/2024  Imm Admin: Fluzone (or Fluarix & Flulaval for VFC) >6mos    03/30/2023  Postponed until 10/4/2023 by Leila Beth (Patient Refused)    10/12/2020  Imm Admin: Influenza, Unspecified    10/12/2020  Imm Admin: Fluzone (or Fluarix & Flulaval for VFC) >6mos    01/06/2019  Imm Admin:  "Influenza, Unspecified    Only the first 5 history entries have been loaded, but more history exists.              Pneumococcal Vaccine 0-64 (Series Information) Aged Out      No completion, postpone, or frequency change history exists for this topic.                    Immunization History   Administered Date(s) Administered    COVID-19 (MODERNA) 1st,2nd,3rd Dose Monovalent 04/12/2021, 05/10/2021, 07/26/2022    COVID-19 (UNSPECIFIED) 04/12/2021    Fluzone (or Fluarix & Flulaval for VFC) >6mos 01/06/2019, 10/12/2020, 01/08/2024    Hepatitis A 01/06/2019, 07/06/2019    Influenza, Unspecified 01/06/2019, 10/12/2020    Tdap 12/03/2014       Colorectal Screening:   Last Completed Colonoscopy       This patient has no relevant Health Maintenance data.          Pap:   Last Completed Pap Smear            PAP SMEAR (Every 3 Years) Next due on 9/8/2026 09/08/2023  Patient-Reported (Performed Externally)    01/01/2021  Done - Negative PAP smear through Gyn early 2021, 3 year followup                   Mammogram:   Last Completed Mammogram       This patient has no relevant Health Maintenance data.             CT for Smoker (Age 50-80, 20 pk yr): Not applicable  Hep C (Age 18-79 once): Negative on 12/19/2022  HIV (Age 15-65 once): Negative on 12/19/2022  A1c:   Hemoglobin A1C   Date Value Ref Range Status   01/08/2024 5.7 4.5 - 5.7 % Final      Lipid panel: No results found for: \"LIPIDEXCLUSI\"    The ASCVD Risk score (Uziel DK, et al., 2019) failed to calculate for the following reasons:    The 2019 ASCVD risk score is only valid for ages 40 to 79      Tobacco Use: Low Risk  (1/8/2024)    Patient History     Smoking Tobacco Use: Never     Smokeless Tobacco Use: Never     Passive Exposure: Not on file       Social History     Substance and Sexual Activity   Alcohol Use Never        Social History     Substance and Sexual Activity   Drug Use Never        Diet/Physical activity: Overhaul attempts to improve dietary intake " "with healthier food types and smaller portions, though difficulty maintaining.  Fairly good activity level but not scheduled exercise    Social History     Substance and Sexual Activity   Sexual Activity Never     No LMP recorded.    Depression: PHQ-2 Depression Screening  PHQ-9 Total Score: 0     Intimate partner violence: (Screen on initial visit, pregnant women, women with injuries, older adult with injury or evidence of neglect):  Violence can be a problem in many people's lives, so I now ask every patient about trauma or abuse they may have experienced in a relationship.  Stress/Safety - Do you feel safe in your relationship?  Afraid/Abused - Have you ever been in a relationship where you were threatened, hurt, or afraid?  Friend/Family - Are your friends aware you have been hurt?  Emergency Plan - Do you have a safe place to go and the resources you need in an emergency?    Osteoporosis:   Ost menopausal women < 65 with RF (advancing age, previous fracture, glucocorticoid therapy, parental hip fracture, low body weight, current cigarette smoking, excessive alcohol consumption, rheumatoid arthritis, secondary osteoporosis [hypogonadism/premature menopause, malabsorption, chronic liver disease, IBD]).  All women 65 or older    Objective     Physical Exam:  Vitals:    01/08/24 1314   BP: 126/84   BP Location: Left arm   Patient Position: Sitting   Cuff Size: Adult   Pulse: 76   Temp: 98.4 °F (36.9 °C)   TempSrc: Temporal   SpO2: 99%   Weight: 114 kg (251 lb)   Height: 167.6 cm (66\")     Body mass index is 40.51 kg/m².     Physical Exam  Constitutional:       General: She is not in acute distress.     Appearance: Normal appearance. She is obese. She is not ill-appearing, toxic-appearing or diaphoretic.   HENT:      Head: Normocephalic and atraumatic.      Comments: Small cyst on the middle the top of the head which is mobile within the subcutaneous tissues, noninflamed with approximately 1/2 - 3/4 centimeter " size     Right Ear: Ear canal and external ear normal.      Left Ear: Ear canal and external ear normal.      Ears:      Comments: Mild fluid behind the TMs bilaterally, otherwise clear     Nose: Rhinorrhea present.      Comments: Mild clear rhinorrhea, pale mucosa     Mouth/Throat:      Mouth: Mucous membranes are moist.      Pharynx: Oropharynx is clear. No oropharyngeal exudate or posterior oropharyngeal erythema.   Eyes:      Extraocular Movements: Extraocular movements intact.      Conjunctiva/sclera: Conjunctivae normal.   Neck:      Vascular: No carotid bruit.   Cardiovascular:      Rate and Rhythm: Normal rate and regular rhythm.      Pulses: Normal pulses.      Heart sounds: Normal heart sounds. No murmur heard.     No friction rub. No gallop.   Pulmonary:      Effort: Pulmonary effort is normal. No respiratory distress.      Breath sounds: Normal breath sounds. No stridor. No wheezing.   Abdominal:      General: Abdomen is flat. Bowel sounds are normal. There is no distension.      Palpations: Abdomen is soft. There is no mass.      Tenderness: There is no abdominal tenderness. There is no guarding or rebound.      Hernia: No hernia is present.   Genitourinary:     Comments: Patient defers as obtained through [facility] gynecology  Musculoskeletal:      Cervical back: Neck supple. No tenderness.      Right lower leg: No edema.      Left lower leg: No edema.   Skin:     General: Skin is warm and dry.      Capillary Refill: Capillary refill takes less than 2 seconds.   Neurological:      General: No focal deficit present.      Mental Status: She is alert and oriented to person, place, and time. Mental status is at baseline.   Psychiatric:         Mood and Affect: Mood normal.         Behavior: Behavior normal.         Thought Content: Thought content normal.         Procedures    Assessment / Plan      Assessment/Plan:   Diagnoses and all orders for this visit:    1. Routine general medical  examination at a health care facility (Primary)  Assessment & Plan:  Last blood work 12/19/2022 which included negative HIV and hepatitis C screening, repeat pending today 1/8/2024.  Tdap vaccine given 12/3/2014.  Multiple COVID vaccines but she is due for new by vaginal vaccine, recommend.  Recommend flu vaccine.  Pap smear negative on 9/26/2023 at  gynecology, keep follow-up with typical recommendation of 3-year repeat.    Orders:  -     CBC & Differential; Future  -     Comprehensive Metabolic Panel; Future  -     Urinalysis With Culture If Indicated - Urine, Clean Catch; Future  -     Lipid Panel; Future  -     TSH; Future  -     T4, Free; Future  -     T4, Free  -     TSH  -     Lipid Panel  -     Urinalysis With Culture If Indicated - Urine, Clean Catch  -     CBC & Differential  -     Comprehensive Metabolic Panel    2. Prediabetes  Assessment & Plan:  Diagnosis with hemoglobin A1c of 5.8% on 12/19/2022.  Modestly worsened to 5.7% today, previous 5.6% on 6/26/2023 after modestly increased to 6.0% on 3/20/2023. I reinforced importance of healthy diet, exercise, benefits of even modest weight loss.  With stabilized pattern, follow-up in 6 months to reassess or sooner as needed.  She is aware of polyuria and polydipsia as potential signs of progression to diabetic pattern.     Orders:  -     POC Glycosylated Hemoglobin (Hb A1C)  -     POC Glucose    3. Generalized anxiety disorder  Assessment & Plan:  Long-standing, for which she has ongoing counseling in [facility].  Initiation of Zoloft 50 mg daily at her 1/23/2019 appointment with psychiatry, with increase to Zoloft 100 mg daily as of early 2023 with improved symptom control.  Continued stability, patient does not desire any medication change.  I have transition to over take prescription of this medication.  Continue unchanged.  No SI/HI.  Continue lifestyle modifications to benefit mood including pursuit of enjoyable activities, good socialization,  regular exercise, et cetera.  Advise concerns.    Orders:  -     sertraline (ZOLOFT) 100 MG tablet; Take 1 tablet by mouth Daily.  Dispense: 90 tablet; Refill: 1    4. Abnormal thyroid function test  Assessment & Plan:  With screening blood work 12/19/2022 she had TSH of 7.020, mildly elevated and normal free T41.03.  Recheck 3/20/2023 normalized with TSH 3.530 and free T41.17.  Consistent with resolving subclinical hypothyroidism pattern, monitor yearly.  The patient continues to be without any hyperthyroid or hypothyroid type symptoms.  Recheck pending with blood work 1/8/2024.      5. Gastroesophageal reflux disease without esophagitis  Assessment & Plan:  Intermittent pattern, responsive to infrequent use of over-the-counter antacids.  Of note she also had history of right upper quadrant abdominal pain leading to cholecystectomy 3/11/2016 by Dr. Pradhan, general surgeon and did well since.  Otherwise continue healthy diet, with reflux precautions discussed.       6. Migraine without aura and without status migrainosus, not intractable  Assessment & Plan:  Diagnosis 12/19/2022, with a pattern of a few migraine headaches over the preceding 6 months or so, typical migrainous headache with light and sound sensitivity, period of nauseousness with an episode of vomiting 1 time, no altered sensorium, no other neurologic manifestations of concern.  Good response to over-the-counter antacids and dark environment.  Addition of Imitrex 50 mg daily as of 12/19/2022 with benefit, infrequently use but when she does it had benefit.  Overall doing modestly better, continue regimen unchanged.  We could consider adding preventative medicine if this became more frequent in future.      Orders:  -     SUMAtriptan (Imitrex) 50 MG tablet; Take one tablet at onset of headache. May repeat dose one time in 2 hours if headache not relieved.  Dispense: 9 tablet; Refill: 1    7. Mixed hyperlipidemia  Assessment & Plan:  12/19/2022 total  cholesterol 255, triglycerides 189, HDL 51, .  Comparison 1/7/2019 with total cholesterol 231, triglycerides 113, HDL 52, .  Modestly worsening, but no indication for statin therapy at this time.  Reinforced importance of healthy diet, exercise and benefits of weight loss.  Recheck pending with 1/8/2020 for blood work.      8. Morbid obesity  Assessment & Plan:  BMI in the low 40s range with stability over the last couple years, but trouble losing weight despite what patient feels to be modest improvement in diet and activity.  Continue changes in that regard.  I did discuss potential benefits of treatment such as phentermine or GLP-1 class of medication but she would like to hold off on medicines, try with lifestyle modifications first.  Advise concerns.      9. Seasonal allergic rhinitis due to pollen  Assessment & Plan:  Seasonal pattern more spring and fall but some breakthrough symptoms recently which have been somewhat bothersome, as such we will add montelukast to her regimen to be used together for another couple weeks, then as needed.  Additional benefit of saline spray, nasal flushing.  Advise concerns.  With overall satisfactory sponsor Flonase and Zyrtec as needed.  Refills provided.  Additional benefit of saline spray, nasal flushing.  Advise concerns.    Orders:  -     cetirizine (EQ Allergy Relief, Cetirizine,) 10 MG tablet; Take 1 tablet by mouth Daily.  Dispense: 30 tablet; Refill: 3  -     fluticasone (Flonase) 50 MCG/ACT nasal spray; 2 sprays into the nostril(s) as directed by provider Daily.  Dispense: 15.8 mL; Refill: 3    10. Subcutaneous cyst  Assessment & Plan:  Nonconcerning subcutaneous cyst in the midline at the top of the head present for many years by report as evaluated 1/8/2024, mobile within the subcutaneous tissues, overall not too bothersome.  I discussed that this became become irritating with hair brushing, wearing a hat, etc. this could be referred for  removal.      11. Need for vaccination  -     Fluzone (or Fluarix & Flulaval for VFC) >6 Mos ([id number])         Vaccine Counseling:  “Discussed risks/benefits to vaccination, reviewed components of the vaccine, discussed VIS, discussed informed consent, informed consent obtained. Patient/Parent was allowed to accept or refuse vaccine. Questions answered to satisfactory state of patient/Parent. We reviewed typical age appropriate and seasonally appropriate vaccinations. Reviewed immunization history and updated state vaccination form as needed. Patient was counseled on Influenza    Healthcare Maintenance:  Counseling provided based on age appropriate USPSTF guidelines.       Keshia Juni voices understanding and acceptance of this advice and will call back with any further questions or concerns. AVS with preventive healthcare tips printed for patient.     Follow Up:   Return in about 6 months (around 7/8/2024) for Next scheduled follow up.        Petr Galdamez MD  [facility]

## 2024-01-08 NOTE — ASSESSMENT & PLAN NOTE
Seasonal pattern more spring and fall but some breakthrough symptoms recently which have been somewhat bothersome, as such we will add montelukast to her regimen to be used together for another couple weeks, then as needed.  Additional benefit of saline spray, nasal flushing.  Advise concerns.  With overall satisfactory sponsor Flonase and Zyrtec as needed.  Refills provided.  Additional benefit of saline spray, nasal flushing.  Advise concerns.

## 2024-01-08 NOTE — ASSESSMENT & PLAN NOTE
12/19/2022 total cholesterol 255, triglycerides 189, HDL 51, .  Comparison 1/7/2019 with total cholesterol 231, triglycerides 113, HDL 52, .  Modestly worsening, but no indication for statin therapy at this time.  Reinforced importance of healthy diet, exercise and benefits of weight loss.  Recheck pending with 1/8/2020 for blood work.   - - -

## 2024-01-08 NOTE — PATIENT INSTRUCTIONS
Health Maintenance, Female  Adopting a healthy lifestyle and getting preventive care can go a long way to promote health and wellness. Talk with your health care provider about what schedule of regular examinations is right for you. This is a good chance for you to check in with your provider about disease prevention and staying healthy.  In between checkups, there are plenty of things you can do on your own. Experts have done a lot of research about which lifestyle changes and preventive measures are most likely to keep you healthy. Ask your health care provider for more information.  Weight and diet  Eat a healthy diet  Be sure to include plenty of vegetables, fruits, low-fat dairy products, and lean protein.  Do not eat a lot of foods high in solid fats, added sugars, or salt.  Get regular exercise. This is one of the most important things you can do for your health.  Most adults should exercise for at least 150 minutes each week. The exercise should increase your heart rate and make you sweat (moderate-intensity exercise).  Most adults should also do strengthening exercises at least twice a week. This is in addition to the moderate-intensity exercise.     Maintain a healthy weight  Body mass index (BMI) is a measurement that can be used to identify possible weight problems. It estimates body fat based on height and weight. Your health care provider can help determine your BMI and help you achieve or maintain a healthy weight.  For females 20 years of age and older:  A BMI below 18.5 is considered underweight.  A BMI of 18.5 to 24.9 is normal.  A BMI of 25 to 29.9 is considered overweight.  A BMI of 30 and above is considered obese.     Watch levels of cholesterol and blood lipids  You should start having your blood tested for lipids and cholesterol at 20 years of age, then have this test every 5 years.  You may need to have your cholesterol levels checked more often if:  Your lipid or cholesterol levels are  high.  You are older than 50 years of age.  You are at high risk for heart disease.     Cancer screening  Lung Cancer  Lung cancer screening is recommended for adults 55-80 years old who are at high risk for lung cancer because of a history of smoking.  A yearly low-dose CT scan of the lungs is recommended for people who:  Currently smoke.  Have quit within the past 15 years.  Have at least a 30-pack-year history of smoking. A pack year is smoking an average of one pack of cigarettes a day for 1 year.  Yearly screening should continue until it has been 15 years since you quit.  Yearly screening should stop if you develop a health problem that would prevent you from having lung cancer treatment.     Breast Cancer  Practice breast self-awareness. This means understanding how your breasts normally appear and feel.  It also means doing regular breast self-exams. Let your health care provider know about any changes, no matter how small.  If you are in your 20s or 30s, you should have a clinical breast exam (CBE) by a health care provider every 1-3 years as part of a regular health exam.  If you are 40 or older, have a CBE every year. Also consider having a breast X-ray (mammogram) every year.  If you have a family history of breast cancer, talk to your health care provider about genetic screening.  If you are at high risk for breast cancer, talk to your health care provider about having an MRI and a mammogram every year.  Breast cancer gene (BRCA) assessment is recommended for women who have family members with BRCA-related cancers. BRCA-related cancers include:  Breast.  Ovarian.  Tubal.  Peritoneal cancers.  Results of the assessment will determine the need for genetic counseling and BRCA1 and BRCA2 testing.     Cervical Cancer  Your health care provider may recommend that you be screened regularly for cancer of the pelvic organs (ovaries, uterus, and vagina). This screening involves a pelvic examination, including  checking for microscopic changes to the surface of your cervix (Pap test). You may be encouraged to have this screening done every 3 years, beginning at age 21.  For women ages 30-65, health care providers may recommend pelvic exams and Pap testing every 3 years, or they may recommend the Pap and pelvic exam, combined with testing for human papilloma virus (HPV), every 5 years. Some types of HPV increase your risk of cervical cancer. Testing for HPV may also be done on women of any age with unclear Pap test results.  Other health care providers may not recommend any screening for nonpregnant women who are considered low risk for pelvic cancer and who do not have symptoms. Ask your health care provider if a screening pelvic exam is right for you.  If you have had past treatment for cervical cancer or a condition that could lead to cancer, you need Pap tests and screening for cancer for at least 20 years after your treatment. If Pap tests have been discontinued, your risk factors (such as having a new sexual partner) need to be reassessed to determine if screening should resume. Some women have medical problems that increase the chance of getting cervical cancer. In these cases, your health care provider may recommend more frequent screening and Pap tests.     Colorectal Cancer  This type of cancer can be detected and often prevented.  Routine colorectal cancer screening usually begins at 50 years of age and continues through 75 years of age.  Your health care provider may recommend screening at an earlier age if you have risk factors for colon cancer.  Your health care provider may also recommend using home test kits to check for hidden blood in the stool.  A small camera at the end of a tube can be used to examine your colon directly (sigmoidoscopy or colonoscopy). This is done to check for the earliest forms of colorectal cancer.  Routine screening usually begins at age 50.  Direct examination of the colon should  be repeated every 5-10 years through 75 years of age. However, you may need to be screened more often if early forms of precancerous polyps or small growths are found.     Skin Cancer  Check your skin from head to toe regularly.  Tell your health care provider about any new moles or changes in moles, especially if there is a change in a mole's shape or color.  Also tell your health care provider if you have a mole that is larger than the size of a pencil eraser.  Always use sunscreen. Apply sunscreen liberally and repeatedly throughout the day.  Protect yourself by wearing long sleeves, pants, a wide-brimmed hat, and sunglasses whenever you are outside.     Heart disease, diabetes, and high blood pressure  High blood pressure causes heart disease and increases the risk of stroke. High blood pressure is more likely to develop in:  People who have blood pressure in the high end of the normal range (130-139/85-89 mm Hg).  People who are overweight or obese.  People who are .  If you are 18-39 years of age, have your blood pressure checked every 3-5 years. If you are 40 years of age or older, have your blood pressure checked every year. You should have your blood pressure measured twice--once when you are at a hospital or clinic, and once when you are not at a hospital or clinic. Record the average of the two measurements. To check your blood pressure when you are not at a hospital or clinic, you can use:  An automated blood pressure machine at a pharmacy.  A home blood pressure monitor.  If you are between 55 years and 79 years old, ask your health care provider if you should take aspirin to prevent strokes.  Have regular diabetes screenings. This involves taking a blood sample to check your fasting blood sugar level.  If you are at a normal weight and have a low risk for diabetes, have this test once every three years after 45 years of age.  If you are overweight and have a high risk for diabetes,  consider being tested at a younger age or more often.  Preventing infection  Hepatitis B  If you have a higher risk for hepatitis B, you should be screened for this virus. You are considered at high risk for hepatitis B if:  You were born in a country where hepatitis B is common. Ask your health care provider which countries are considered high risk.  Your parents were born in a high-risk country, and you have not been immunized against hepatitis B (hepatitis B vaccine).  You have HIV or AIDS.  You use needles to inject street drugs.  You live with someone who has hepatitis B.  You have had sex with someone who has hepatitis B.  You get hemodialysis treatment.  You take certain medicines for conditions, including cancer, organ transplantation, and autoimmune conditions.     Hepatitis C  Blood testing is recommended for:  Everyone born from 1945 through 1965.  Anyone with known risk factors for hepatitis C.     Sexually transmitted infections (STIs)  You should be screened for sexually transmitted infections (STIs) including gonorrhea and chlamydia if:  You are sexually active and are younger than 24 years of age.  You are older than 24 years of age and your health care provider tells you that you are at risk for this type of infection.  Your sexual activity has changed since you were last screened and you are at an increased risk for chlamydia or gonorrhea. Ask your health care provider if you are at risk.  If you do not have HIV, but are at risk, it may be recommended that you take a prescription medicine daily to prevent HIV infection. This is called pre-exposure prophylaxis (PrEP). You are considered at risk if:  You are sexually active and do not regularly use condoms or know the HIV status of your partner(s).  You take drugs by injection.  You are sexually active with a partner who has HIV.     Talk with your health care provider about whether you are at high risk of being infected with HIV. If you choose to  begin PrEP, you should first be tested for HIV. You should then be tested every 3 months for as long as you are taking PrEP.  Pregnancy  If you are premenopausal and you may become pregnant, ask your health care provider about preconception counseling.  If you may become pregnant, take 400 to 800 micrograms (mcg) of folic acid every day.  If you want to prevent pregnancy, talk to your health care provider about birth control (contraception).  Osteoporosis and menopause  Osteoporosis is a disease in which the bones lose minerals and strength with aging. This can result in serious bone fractures. Your risk for osteoporosis can be identified using a bone density scan.  If you are 65 years of age or older, or if you are at risk for osteoporosis and fractures, ask your health care provider if you should be screened.  Ask your health care provider whether you should take a calcium or vitamin D supplement to lower your risk for osteoporosis.  Menopause may have certain physical symptoms and risks.  Hormone replacement therapy may reduce some of these symptoms and risks.  Talk to your health care provider about whether hormone replacement therapy is right for you.  Follow these instructions at home:  Schedule regular health, dental, and eye exams.  Stay current with your immunizations.  Do not use any tobacco products including cigarettes, chewing tobacco, or electronic cigarettes.  If you are pregnant, do not drink alcohol.  If you are breastfeeding, limit how much and how often you drink alcohol.  Limit alcohol intake to no more than 1 drink per day for nonpregnant women. One drink equals 12 ounces of beer, 5 ounces of wine, or 1½ ounces of hard liquor.  Do not use street drugs.  Do not share needles.  Ask your health care provider for help if you need support or information about quitting drugs.  Tell your health care provider if you often feel depressed.  Tell your health care provider if you have ever been abused or do  not feel safe at home.  This information is not intended to replace advice given to you by your health care provider. Make sure you discuss any questions you have with your health care provider.  Document Released: 07/02/2012 Document Revised: 05/25/2017 Document Reviewed: 09/20/2016  [employer] Patient Education © 2018 [employer]

## 2024-01-08 NOTE — PROGRESS NOTES
Venipuncture Blood Specimen Collection  Venipuncture performed in right arm by Barbara Saunders MA with good hemostasis. Patient tolerated the procedure well without complications.   01/08/24   Barbara Saunders MA

## 2024-01-08 NOTE — ASSESSMENT & PLAN NOTE
Long-standing, for which she has ongoing counseling in [facility].  Initiation of Zoloft 50 mg daily at her 1/23/2019 appointment with psychiatry, with increase to Zoloft 100 mg daily as of early 2023 with improved symptom control.  Continued stability, patient does not desire any medication change.  I have transition to over take prescription of this medication.  Continue unchanged.  No SI/HI.  Continue lifestyle modifications to benefit mood including pursuit of enjoyable activities, good socialization, regular exercise, et cetera.  Advise concerns.

## 2024-01-08 NOTE — ASSESSMENT & PLAN NOTE
Diagnosis 12/19/2022, with a pattern of a few migraine headaches over the preceding 6 months or so, typical migrainous headache with light and sound sensitivity, period of nauseousness with an episode of vomiting 1 time, no altered sensorium, no other neurologic manifestations of concern.  Good response to over-the-counter antacids and dark environment.  Addition of Imitrex 50 mg daily as of 12/19/2022 with benefit, infrequently use but when she does it had benefit.  Overall doing modestly better, continue regimen unchanged.  We could consider adding preventative medicine if this became more frequent in future.

## 2024-01-08 NOTE — ASSESSMENT & PLAN NOTE
Last blood work 12/19/2022 which included negative HIV and hepatitis C screening, repeat pending today 1/8/2024.  Tdap vaccine given 12/3/2014.  Multiple COVID vaccines but she is due for new by vaginal vaccine, recommend.  Recommend flu vaccine.  Pap smear negative on 9/26/2023 at  gynecology, keep follow-up with typical recommendation of 3-year repeat.

## 2024-01-08 NOTE — ASSESSMENT & PLAN NOTE
Nonconcerning subcutaneous cyst in the midline at the top of the head present for many years by report as evaluated 1/8/2024, mobile within the subcutaneous tissues, overall not too bothersome.  I discussed that this became become irritating with hair brushing, wearing a hat, etc. this could be referred for removal.

## 2024-01-08 NOTE — ASSESSMENT & PLAN NOTE
With screening blood work 12/19/2022 she had TSH of 7.020, mildly elevated and normal free T41.03.  Recheck 3/20/2023 normalized with TSH 3.530 and free T41.17.  Consistent with resolving subclinical hypothyroidism pattern, monitor yearly.  The patient continues to be without any hyperthyroid or hypothyroid type symptoms.  Recheck pending with blood work 1/8/2024.

## 2024-01-09 LAB
ALBUMIN SERPL-MCNC: 4.1 G/DL (ref 3.9–4.9)
ALBUMIN/GLOB SERPL: 1.6 {RATIO} (ref 1.2–2.2)
ALP SERPL-CCNC: 84 IU/L (ref 44–121)
ALT SERPL-CCNC: 10 IU/L (ref 0–32)
AST SERPL-CCNC: 13 IU/L (ref 0–40)
BASOPHILS # BLD AUTO: 0.1 X10E3/UL (ref 0–0.2)
BASOPHILS NFR BLD AUTO: 1 %
BILIRUB SERPL-MCNC: 0.3 MG/DL (ref 0–1.2)
BUN SERPL-MCNC: 8 MG/DL (ref 6–20)
BUN/CREAT SERPL: 11 (ref 9–23)
CALCIUM SERPL-MCNC: 9.2 MG/DL (ref 8.7–10.2)
CHLORIDE SERPL-SCNC: 102 MMOL/L (ref 96–106)
CHOLEST SERPL-MCNC: 271 MG/DL (ref 100–199)
CO2 SERPL-SCNC: 21 MMOL/L (ref 20–29)
CREAT SERPL-MCNC: 0.7 MG/DL (ref 0.57–1)
EGFRCR SERPLBLD CKD-EPI 2021: 117 ML/MIN/1.73
EOSINOPHIL # BLD AUTO: 0.1 X10E3/UL (ref 0–0.4)
EOSINOPHIL NFR BLD AUTO: 1 %
ERYTHROCYTE [DISTWIDTH] IN BLOOD BY AUTOMATED COUNT: 13.9 % (ref 11.7–15.4)
GLOBULIN SER CALC-MCNC: 2.5 G/DL (ref 1.5–4.5)
GLUCOSE SERPL-MCNC: 83 MG/DL (ref 70–99)
HCT VFR BLD AUTO: 40.1 % (ref 34–46.6)
HDLC SERPL-MCNC: 51 MG/DL
HGB BLD-MCNC: 13.5 G/DL (ref 11.1–15.9)
IMM GRANULOCYTES # BLD AUTO: 0 X10E3/UL (ref 0–0.1)
IMM GRANULOCYTES NFR BLD AUTO: 0 %
LDLC SERPL CALC-MCNC: 182 MG/DL (ref 0–99)
LYMPHOCYTES # BLD AUTO: 2.4 X10E3/UL (ref 0.7–3.1)
LYMPHOCYTES NFR BLD AUTO: 29 %
MCH RBC QN AUTO: 28.6 PG (ref 26.6–33)
MCHC RBC AUTO-ENTMCNC: 33.7 G/DL (ref 31.5–35.7)
MCV RBC AUTO: 85 FL (ref 79–97)
MONOCYTES # BLD AUTO: 0.3 X10E3/UL (ref 0.1–0.9)
MONOCYTES NFR BLD AUTO: 4 %
NEUTROPHILS # BLD AUTO: 5.4 X10E3/UL (ref 1.4–7)
NEUTROPHILS NFR BLD AUTO: 65 %
PLATELET # BLD AUTO: 314 X10E3/UL (ref 150–450)
POTASSIUM SERPL-SCNC: 4.5 MMOL/L (ref 3.5–5.2)
PROT SERPL-MCNC: 6.6 G/DL (ref 6–8.5)
RBC # BLD AUTO: 4.72 X10E6/UL (ref 3.77–5.28)
SODIUM SERPL-SCNC: 140 MMOL/L (ref 134–144)
T4 FREE SERPL-MCNC: 1.06 NG/DL (ref 0.82–1.77)
TRIGL SERPL-MCNC: 203 MG/DL (ref 0–149)
TSH SERPL DL<=0.005 MIU/L-ACNC: 2.56 UIU/ML (ref 0.45–4.5)
VLDLC SERPL CALC-MCNC: 38 MG/DL (ref 5–40)
WBC # BLD AUTO: 8.3 X10E3/UL (ref 3.4–10.8)

## 2024-01-10 ENCOUNTER — PATIENT ROUNDING (BHMG ONLY) (OUTPATIENT)
Dept: FAMILY MEDICINE CLINIC | Facility: CLINIC | Age: 34
End: 2024-01-10
Payer: COMMERCIAL

## 2024-01-10 NOTE — PROGRESS NOTES
.A [employer] message has been sent to the patient for [place] [facility] [place]

## 2024-01-11 LAB
APPEARANCE UR: CLEAR
BACTERIA #/AREA URNS HPF: NORMAL /[HPF]
BILIRUB UR QL STRIP: NEGATIVE
CASTS URNS QL MICRO: NORMAL /LPF
COLOR UR: YELLOW
EPI CELLS #/AREA URNS HPF: NORMAL /HPF (ref 0–10)
GLUCOSE UR QL STRIP: NEGATIVE
HGB UR QL STRIP: NEGATIVE
KETONES UR QL STRIP: NEGATIVE
LEUKOCYTE ESTERASE UR QL STRIP: NEGATIVE
MICRO URNS: NORMAL
MICRO URNS: NORMAL
NITRITE UR QL STRIP: NEGATIVE
PH UR STRIP: 6 [PH] (ref 5–7.5)
PROT UR QL STRIP: NEGATIVE
RBC #/AREA URNS HPF: NORMAL /HPF (ref 0–2)
SP GR UR STRIP: 1.02 (ref 1–1.03)
URINALYSIS REFLEX: NORMAL
UROBILINOGEN UR STRIP-MCNC: 0.2 MG/DL (ref 0.2–1)
WBC #/AREA URNS HPF: NORMAL /HPF (ref 0–5)

## 2024-01-12 ENCOUNTER — TELEPHONE (OUTPATIENT)
Dept: FAMILY MEDICINE CLINIC | Facility: CLINIC | Age: 34
End: 2024-01-12
Payer: COMMERCIAL

## 2024-01-12 NOTE — TELEPHONE ENCOUNTER
----- Message from Petr Galdamez MD sent at 1/11/2024  5:39 PM EST -----  Please speak to the patient regarding laboratory investigations as obtained 1/8/2024.  Notable results as follows:    Total cholesterol modestly elevated at 271, triglycerides mildly elevated 203, HDL good at 51 and LDL increased at 182.  Comparison 1 year prior with total cholesterol 255, triglycerides 189, HDL 51, .  Modestly worsened cholesterol from previous year.  Urinalysis and urine microscopy negative.  TSH normal at 2.560, free T4 normal at 1.06.  Comparison last 9 months ago with TSH 3.530, free T41.17.  In good range.  CBC with differential with normal blood counts.  CMP with normal glucose 83, good kidney function with creatinine 0.70, .  Normal electrolytes, proteins and liver function test.    Related to cholesterol profile, modest worsening for which we are already in the process of working on dietary improvement, exercise and weight loss which we need to continue and will monitor closely.  No indication for medication at this time but if this were to worsen we would consider statin therapy in the future.  Blood work otherwise in good range, monitor yearly.

## 2024-01-12 NOTE — TELEPHONE ENCOUNTER
Spoke to patient in regards to labs will work on improving the exercise and weight loss. Will follow up in 6 months and go from there.

## 2024-07-08 ENCOUNTER — OFFICE VISIT (OUTPATIENT)
Dept: FAMILY MEDICINE CLINIC | Facility: CLINIC | Age: 34
End: 2024-07-08
Payer: COMMERCIAL

## 2024-07-08 VITALS
DIASTOLIC BLOOD PRESSURE: 78 MMHG | WEIGHT: 259 LBS | HEART RATE: 75 BPM | OXYGEN SATURATION: 100 % | BODY MASS INDEX: 41.62 KG/M2 | TEMPERATURE: 98.4 F | SYSTOLIC BLOOD PRESSURE: 122 MMHG | HEIGHT: 66 IN

## 2024-07-08 DIAGNOSIS — G43.009 MIGRAINE WITHOUT AURA AND WITHOUT STATUS MIGRAINOSUS, NOT INTRACTABLE: ICD-10-CM

## 2024-07-08 DIAGNOSIS — R94.6 ABNORMAL THYROID FUNCTION TEST: ICD-10-CM

## 2024-07-08 DIAGNOSIS — E66.01 MORBID OBESITY: ICD-10-CM

## 2024-07-08 DIAGNOSIS — K21.9 GASTROESOPHAGEAL REFLUX DISEASE WITHOUT ESOPHAGITIS: ICD-10-CM

## 2024-07-08 DIAGNOSIS — E78.2 MIXED HYPERLIPIDEMIA: ICD-10-CM

## 2024-07-08 DIAGNOSIS — R73.03 PREDIABETES: Primary | ICD-10-CM

## 2024-07-08 DIAGNOSIS — F41.1 GENERALIZED ANXIETY DISORDER: ICD-10-CM

## 2024-07-08 LAB
EXPIRATION DATE: ABNORMAL
EXPIRATION DATE: NORMAL
GLUCOSE BLDC GLUCOMTR-MCNC: 100 MG/DL (ref 70–130)
HBA1C MFR BLD: 5.8 % (ref 4.5–5.7)
Lab: ABNORMAL
Lab: NORMAL

## 2024-07-08 PROCEDURE — 82947 ASSAY GLUCOSE BLOOD QUANT: CPT | Performed by: INTERNAL MEDICINE

## 2024-07-08 PROCEDURE — 99214 OFFICE O/P EST MOD 30 MIN: CPT | Performed by: INTERNAL MEDICINE

## 2024-07-08 PROCEDURE — 83036 HEMOGLOBIN GLYCOSYLATED A1C: CPT | Performed by: INTERNAL MEDICINE

## 2024-07-08 PROCEDURE — 1159F MED LIST DOCD IN RCRD: CPT | Performed by: INTERNAL MEDICINE

## 2024-07-08 PROCEDURE — 1160F RVW MEDS BY RX/DR IN RCRD: CPT | Performed by: INTERNAL MEDICINE

## 2024-07-08 PROCEDURE — 3044F HG A1C LEVEL LT 7.0%: CPT | Performed by: INTERNAL MEDICINE

## 2024-07-08 NOTE — ASSESSMENT & PLAN NOTE
With screening blood work 12/19/2022 she had TSH of 7.020, mildly elevated and normal free T41.03.  Recheck 3/20/2023 normalized with TSH 3.530 and free T41.17, continues in good range 1/8/2024 with TSH 2.560 and free T41.06..  Consistent with resolved subclinical hypothyroidism pattern, monitor yearly.  The patient continues to be without any hyperthyroid or hypothyroid type symptoms.  Advise concerns.

## 2024-07-08 NOTE — ASSESSMENT & PLAN NOTE
Long-standing, for which she has ongoing counseling in [facility].  Initiation of Zoloft 50 mg daily at her 1/23/2019 appointment with psychiatry, with increase to Zoloft 100 mg daily as of early 2023 with improved symptom control.  Continued stability, patient does not desire any medication change.  I have assumed prescribing of this medication.  Continue unchanged.  No SI/HI.  Continue lifestyle modifications to benefit mood including pursuit of enjoyable activities, good socialization, regular exercise, et cetera.  Advise concerns.

## 2024-07-08 NOTE — ASSESSMENT & PLAN NOTE
Diagnosis with hemoglobin A1c of 5.8% on 12/19/2022.  Modestly worsened to 5.8% today, 5.7% 1/8/2024, previous 5.6% on 6/26/2023 after modestly increased to 6.0% on 3/20/2023. I reinforced importance of healthy diet, exercise, benefits of even modest weight loss.  Despite slightly increased pattern still in reasonable range for 6-month follow-up visit.  She is aware of polyuria and polydipsia as potential signs of progression to diabetic pattern.

## 2024-07-08 NOTE — PROGRESS NOTES
Follow Up Office Visit      Date: 2024   Patient Name: Keshia Alfredo  : 1990   MRN: [id number]     Chief Complaint:    Chief Complaint   Patient presents with    Follow-up       History of Present Illness: Keshia Alfredo is a 33 y.o. female who is here today to follow up with multimedical problems peer regarding prediabetic pattern with associated hyperlipidemia obesity, efforts to eat healthy and be active, especially last month or so with no significant improvement in weight pattern but she does feel again that she is doing a little bit better.  No polyuria polydipsia.  We discussed again potential medications to help with weight loss such as phentermine or GLP-1 agonist class, she is not quite ready to pursue, although understands potential benefit.  Anxiety pattern still overall stable on current regimen of medicine.  Periodic stressors but she feels she handles them recently well.  With history of subclinical hypothyroidism, 2024 blood work in normal range and she is not having any hyperthyroidism or hypothyroidism type symptoms.  Reflux symptoms doing well without any recent need for medicine.  With her migraine headache pattern, rare use of Imitrex for breakthrough headache.  Allergies flareup periodically but good response to medication..    Subjective      Review of Systems:   Review of Systems    I have reviewed the patients family history, social history, past medical history, past surgical history and have updated it as appropriate.     Medications:     Current Outpatient Medications:     cetirizine (EQ Allergy Relief, Cetirizine,) 10 MG tablet, Take 1 tablet by mouth Daily., Disp: 30 tablet, Rfl: 3    fluticasone (Flonase) 50 MCG/ACT nasal spray, 2 sprays into the nostril(s) as directed by provider Daily., Disp: 15.8 mL, Rfl: 3    norgestimate-ethinyl estradiol (ORTHO-CYCLEN) 0.25-35 MG-MCG per tablet, Take 1 tablet by mouth Daily., Disp: , Rfl:     sertraline (ZOLOFT) 100 MG  "tablet, Take 1 tablet by mouth Daily., Disp: 90 tablet, Rfl: 1    SUMAtriptan (Imitrex) 50 MG tablet, Take one tablet at onset of headache. May repeat dose one time in 2 hours if headache not relieved., Disp: 9 tablet, Rfl: 1    Allergies:   No Known Allergies    Objective     Physical Exam: Please see above  Vital Signs:   Vitals:    07/08/24 1017 07/08/24 1030   BP: 132/80 122/78   BP Location: Right arm    Patient Position: Sitting    Cuff Size: Adult    Pulse: 75    Temp: 98.4 °F (36.9 °C)    TempSrc: Temporal    SpO2: 100%    Weight: 117 kg (259 lb)    Height: 167.6 cm (66\")      Facility age limit for growth %mina is 20 years.  Body mass index is 41.8 kg/m².    Physical Exam  Constitutional:       General: She is not in acute distress.     Appearance: Normal appearance. She is obese. She is not ill-appearing, toxic-appearing or diaphoretic.   HENT:      Head: Normocephalic and atraumatic.      Right Ear: Ear canal and external ear normal.      Left Ear: Ear canal and external ear normal.      Ears:      Comments: Mild fluid behind the TMs bilaterally, otherwise clear     Nose: Nose normal. Rhinorrhea present.      Comments: Mild clear rhinorrhea, pale mucosa     Mouth/Throat:      Mouth: Mucous membranes are moist.      Pharynx: Oropharynx is clear. No oropharyngeal exudate or posterior oropharyngeal erythema.   Neck:      Vascular: No carotid bruit.      Comments: No thyroid enlargement, no thyroid nodules  Cardiovascular:      Rate and Rhythm: Normal rate and regular rhythm.      Pulses: Normal pulses.      Heart sounds: Normal heart sounds. No murmur heard.     No friction rub. No gallop.   Pulmonary:      Effort: Pulmonary effort is normal. No respiratory distress.      Breath sounds: Normal breath sounds. No stridor. No wheezing.   Musculoskeletal:      Cervical back: Neck supple. No tenderness.      Right lower leg: No edema.      Left lower leg: No edema.   Lymphadenopathy:      Cervical: No cervical " adenopathy.   Skin:     General: Skin is warm and dry.      Capillary Refill: Capillary refill takes less than 2 seconds.   Neurological:      General: No focal deficit present.      Mental Status: She is alert and oriented to person, place, and time. Mental status is at baseline.   Psychiatric:         Mood and Affect: Mood normal.         Behavior: Behavior normal.         Thought Content: Thought content normal.         Procedures    Results:   Labs:   Hemoglobin A1C   Date Value Ref Range Status   07/08/2024 5.8 (A) 4.5 - 5.7 % Final     TSH   Date Value Ref Range Status   01/08/2024 2.560 0.450 - 4.500 uIU/mL Final        Imaging:   No valid procedures specified.     Class 3 Severe Obesity (BMI >=40). Obesity-related health conditions include the following: GERD. Obesity is unchanged. BMI is is above average; BMI management plan is completed. We discussed low calorie, low carb based diet program, portion control, increasing exercise, joining a fitness center or start home based exercise program, and pharmacologic options including phentermine and GLP-1 class of medicine discussed with patient declines desire to use .      Vaccine Counseling:      Assessment / Plan      Assessment/Plan:   Diagnoses and all orders for this visit:    1. Prediabetes (Primary)  Assessment & Plan:  Diagnosis with hemoglobin A1c of 5.8% on 12/19/2022.  Modestly worsened to 5.8% today, 5.7% 1/8/2024, previous 5.6% on 6/26/2023 after modestly increased to 6.0% on 3/20/2023. I reinforced importance of healthy diet, exercise, benefits of even modest weight loss.  Despite slightly increased pattern still in reasonable range for 6-month follow-up visit.  She is aware of polyuria and polydipsia as potential signs of progression to diabetic pattern.     Orders:  -     POC Glycosylated Hemoglobin (Hb A1C)  -     POC Glucose, Blood    2. Abnormal thyroid function test  Assessment & Plan:  With screening blood work 12/19/2022 she had TSH of  7.020, mildly elevated and normal free T41.03.  Recheck 3/20/2023 normalized with TSH 3.530 and free T41.17, continues in good range 1/8/2024 with TSH 2.560 and free T41.06..  Consistent with resolved subclinical hypothyroidism pattern, monitor yearly.  The patient continues to be without any hyperthyroid or hypothyroid type symptoms.  Advise concerns.      3. Generalized anxiety disorder  Assessment & Plan:  Long-standing, for which she has ongoing counseling in [facility].  Initiation of Zoloft 50 mg daily at her 1/23/2019 appointment with psychiatry, with increase to Zoloft 100 mg daily as of early 2023 with improved symptom control.  Continued stability, patient does not desire any medication change.  I have assumed prescribing of this medication.  Continue unchanged.  No SI/HI.  Continue lifestyle modifications to benefit mood including pursuit of enjoyable activities, good socialization, regular exercise, et cetera.  Advise concerns.       4. Gastroesophageal reflux disease without esophagitis  Assessment & Plan:  Intermittent pattern, responsive to infrequent use of over-the-counter antacids.  Of note she also had history of right upper quadrant abdominal pain leading to cholecystectomy 3/11/2016 by Dr. Pradhan, general surgeon and did well since.  Otherwise continue healthy diet, with reflux precautions discussed.       5. Mixed hyperlipidemia  Assessment & Plan:  1/8/2024 total cholesterol 271, triglycerides 203, HDL 51, .  Comparison 12/19/2022 total cholesterol 255, triglycerides 189, HDL 51, , 1/7/2019 with total cholesterol 231, triglycerides 113, HDL 52, .  Continually worsening pattern, but no still not indication for statin therapy at this time, nonetheless if there is to increase further in the future, she would get closer to the indication for statin therapy.  Reinforced importance of healthy diet, exercise and benefits of weight loss.  I will monitor at minimum  yearly.      6. Morbid obesity  Assessment & Plan:  BMI in the low 40s range with stability over the last couple years, but trouble losing weight despite what patient feels to be modest improvement in diet and activity. Continue changes in that regard. I did discuss potential benefits of treatment such as phentermine or GLP-1 class of medication but she would like to hold off on medicines, and will advise if she changes her mind.  Advise concerns.       7. Migraine without aura and without status migrainosus, not intractable  Assessment & Plan:  Diagnosis 12/19/2022, with a pattern of a few migraine headaches over the preceding months, with typical migrainous headache with light and sound sensitivity, period of nauseousness with an episode of vomiting 1 time, no altered sensorium, no other neurologic manifestations of concern.  Good response to over-the-counter antacids and dark environment.  Addition of Imitrex 50 mg daily as of 12/19/2022 with benefit, infrequently use but when she does it had benefit.  Overall doing modestly better, continue regimen unchanged.  We could consider adding preventative medicine if this became more frequent in future.  Advise concerns.          Follow Up:   Return in about 6 months (around 1/8/2025) for Annual physical.      Petr Galdamez MD  [facility]

## 2024-07-08 NOTE — ASSESSMENT & PLAN NOTE
BMI in the low 40s range with stability over the last couple years, but trouble losing weight despite what patient feels to be modest improvement in diet and activity. Continue changes in that regard. I did discuss potential benefits of treatment such as phentermine or GLP-1 class of medication but she would like to hold off on medicines, and will advise if she changes her mind.  Advise concerns.

## 2024-07-08 NOTE — ASSESSMENT & PLAN NOTE
Diagnosis 12/19/2022, with a pattern of a few migraine headaches over the preceding months, with typical migrainous headache with light and sound sensitivity, period of nauseousness with an episode of vomiting 1 time, no altered sensorium, no other neurologic manifestations of concern.  Good response to over-the-counter antacids and dark environment.  Addition of Imitrex 50 mg daily as of 12/19/2022 with benefit, infrequently use but when she does it had benefit.  Overall doing modestly better, continue regimen unchanged.  We could consider adding preventative medicine if this became more frequent in future.  Advise concerns.

## 2024-07-08 NOTE — ASSESSMENT & PLAN NOTE
1/8/2024 total cholesterol 271, triglycerides 203, HDL 51, .  Comparison 12/19/2022 total cholesterol 255, triglycerides 189, HDL 51, , 1/7/2019 with total cholesterol 231, triglycerides 113, HDL 52, .  Continually worsening pattern, but no still not indication for statin therapy at this time, nonetheless if there is to increase further in the future, she would get closer to the indication for statin therapy.  Reinforced importance of healthy diet, exercise and benefits of weight loss.  I will monitor at minimum yearly.